# Patient Record
Sex: FEMALE | Race: WHITE | Employment: PART TIME | URBAN - METROPOLITAN AREA
[De-identification: names, ages, dates, MRNs, and addresses within clinical notes are randomized per-mention and may not be internally consistent; named-entity substitution may affect disease eponyms.]

---

## 2017-01-09 ENCOUNTER — HOSPITAL ENCOUNTER (OUTPATIENT)
Dept: OTHER | Age: 32
Discharge: HOME | End: 2017-01-09
Attending: INTERNAL MEDICINE | Admitting: INTERNAL MEDICINE

## 2017-01-09 LAB — TSH REFLEX: 1.34 UIU/ML (ref 0.27–4.2)

## 2017-01-10 ENCOUNTER — OFFICE VISIT (OUTPATIENT)
Dept: DERMATOLOGY | Age: 32
End: 2017-01-10

## 2017-01-10 DIAGNOSIS — L57.0 ACTINIC KERATOSES: ICD-10-CM

## 2017-01-10 DIAGNOSIS — D48.5 NEOPLASM OF UNCERTAIN BEHAVIOR OF SKIN: ICD-10-CM

## 2017-01-10 DIAGNOSIS — Z12.83 SCREENING EXAM FOR SKIN CANCER: ICD-10-CM

## 2017-01-10 DIAGNOSIS — D22.9 MULTIPLE BENIGN NEVI: Primary | ICD-10-CM

## 2017-01-10 PROCEDURE — 17000 DESTRUCT PREMALG LESION: CPT | Performed by: DERMATOLOGY

## 2017-01-10 PROCEDURE — 17003 DESTRUCT PREMALG LES 2-14: CPT | Performed by: DERMATOLOGY

## 2017-01-10 PROCEDURE — 11100 PR BIOPSY OF SKIN LESION: CPT | Performed by: DERMATOLOGY

## 2017-01-10 PROCEDURE — 99203 OFFICE O/P NEW LOW 30 MIN: CPT | Performed by: DERMATOLOGY

## 2017-01-11 ENCOUNTER — OFFICE VISIT (OUTPATIENT)
Dept: ENDOCRINOLOGY | Age: 32
End: 2017-01-11

## 2017-01-11 VITALS
DIASTOLIC BLOOD PRESSURE: 78 MMHG | BODY MASS INDEX: 31.92 KG/M2 | HEIGHT: 67 IN | OXYGEN SATURATION: 100 % | WEIGHT: 203.4 LBS | RESPIRATION RATE: 16 BRPM | SYSTOLIC BLOOD PRESSURE: 118 MMHG | HEART RATE: 76 BPM

## 2017-01-11 DIAGNOSIS — E04.1 THYROID NODULE: ICD-10-CM

## 2017-01-11 DIAGNOSIS — E03.9 ACQUIRED HYPOTHYROIDISM: Primary | ICD-10-CM

## 2017-01-11 DIAGNOSIS — E06.3 HASHIMOTO'S DISEASE: ICD-10-CM

## 2017-01-11 PROCEDURE — 99213 OFFICE O/P EST LOW 20 MIN: CPT | Performed by: INTERNAL MEDICINE

## 2017-01-12 ENCOUNTER — TELEPHONE (OUTPATIENT)
Dept: DERMATOLOGY | Age: 32
End: 2017-01-12

## 2017-01-17 RX ORDER — LEVOTHYROXINE SODIUM 150 MCG
TABLET ORAL
Qty: 90 TABLET | Refills: 1 | Status: SHIPPED | OUTPATIENT
Start: 2017-01-17 | End: 2017-08-04 | Stop reason: SDUPTHER

## 2017-01-25 LAB
HPV COMMENT: NORMAL
HPV TYPE 16: NOT DETECTED
HPV TYPE 18: NOT DETECTED
HPVOH (OTHER TYPES): NOT DETECTED

## 2017-02-23 ENCOUNTER — TELEPHONE (OUTPATIENT)
Dept: DERMATOLOGY | Age: 32
End: 2017-02-23

## 2017-02-28 ENCOUNTER — OFFICE VISIT (OUTPATIENT)
Dept: DERMATOLOGY | Age: 32
End: 2017-02-28

## 2017-02-28 DIAGNOSIS — L71.9 ROSACEA: Primary | ICD-10-CM

## 2017-02-28 PROCEDURE — 99213 OFFICE O/P EST LOW 20 MIN: CPT | Performed by: DERMATOLOGY

## 2017-03-22 ENCOUNTER — TELEPHONE (OUTPATIENT)
Dept: ENDOCRINOLOGY | Age: 32
End: 2017-03-22

## 2017-04-07 ENCOUNTER — HOSPITAL ENCOUNTER (OUTPATIENT)
Dept: OTHER | Age: 32
Discharge: HOME | End: 2017-04-07
Attending: INTERNAL MEDICINE | Admitting: INTERNAL MEDICINE

## 2017-04-07 ENCOUNTER — HOSPITAL ENCOUNTER (OUTPATIENT)
Dept: OTHER | Age: 32
Discharge: HOME | End: 2017-04-07
Attending: OBSTETRICS & GYNECOLOGY | Admitting: OBSTETRICS & GYNECOLOGY

## 2017-04-07 LAB
ABO/RH: NORMAL
ANTIBODY SCREEN: NORMAL
HEPATITIS C ANTIBODY INTERPRETATION: NORMAL
TSH REFLEX: 1.72 UIU/ML (ref 0.27–4.2)

## 2017-04-08 LAB
BASOPHILS ABSOLUTE: 0.1 K/UL (ref 0–0.2)
BASOPHILS RELATIVE PERCENT: 0.7 %
EOSINOPHILS ABSOLUTE: 0.1 K/UL (ref 0–0.6)
EOSINOPHILS RELATIVE PERCENT: 1.3 %
HCT VFR BLD CALC: 41.6 % (ref 36–48)
HEMOGLOBIN: 14 G/DL (ref 12–16)
HEPATITIS B SURFACE ANTIGEN INTERPRETATION: ABNORMAL
LYMPHOCYTES ABSOLUTE: 2.2 K/UL (ref 1–5.1)
LYMPHOCYTES RELATIVE PERCENT: 27.5 %
MCH RBC QN AUTO: 29.1 PG (ref 26–34)
MCHC RBC AUTO-ENTMCNC: 33.7 G/DL (ref 31–36)
MCV RBC AUTO: 86.5 FL (ref 80–100)
MONOCYTES ABSOLUTE: 0.6 K/UL (ref 0–1.3)
MONOCYTES RELATIVE PERCENT: 7.8 %
NEUTROPHILS ABSOLUTE: 5 K/UL (ref 1.7–7.7)
NEUTROPHILS RELATIVE PERCENT: 62.7 %
PDW BLD-RTO: 13.2 % (ref 12.4–15.4)
PLATELET # BLD: 125 K/UL (ref 135–450)
PMV BLD AUTO: 11.1 FL (ref 5–10.5)
RBC # BLD: 4.81 M/UL (ref 4–5.2)
RPR: ABNORMAL
RUBELLA ANTIBODY IGG: >500 IU/ML
WBC # BLD: 7.9 K/UL (ref 4–11)

## 2017-04-10 LAB — HIV-1 AND HIV-2 ANTIBODIES: NORMAL

## 2017-04-18 ENCOUNTER — OFFICE VISIT (OUTPATIENT)
Dept: ENDOCRINOLOGY | Age: 32
End: 2017-04-18

## 2017-04-18 VITALS
BODY MASS INDEX: 32.24 KG/M2 | SYSTOLIC BLOOD PRESSURE: 126 MMHG | WEIGHT: 205.4 LBS | RESPIRATION RATE: 16 BRPM | OXYGEN SATURATION: 99 % | HEIGHT: 67 IN | HEART RATE: 78 BPM | DIASTOLIC BLOOD PRESSURE: 83 MMHG

## 2017-04-18 DIAGNOSIS — E04.1 THYROID NODULE: ICD-10-CM

## 2017-04-18 DIAGNOSIS — E06.3 HASHIMOTO'S DISEASE: ICD-10-CM

## 2017-04-18 DIAGNOSIS — E03.9 ACQUIRED HYPOTHYROIDISM: Primary | ICD-10-CM

## 2017-04-18 PROCEDURE — 99213 OFFICE O/P EST LOW 20 MIN: CPT | Performed by: INTERNAL MEDICINE

## 2017-05-24 ENCOUNTER — OFFICE VISIT (OUTPATIENT)
Dept: INTERNAL MEDICINE CLINIC | Age: 32
End: 2017-05-24

## 2017-05-24 VITALS
SYSTOLIC BLOOD PRESSURE: 114 MMHG | RESPIRATION RATE: 16 BRPM | HEIGHT: 67 IN | DIASTOLIC BLOOD PRESSURE: 82 MMHG | OXYGEN SATURATION: 98 % | WEIGHT: 209 LBS | HEART RATE: 69 BPM | BODY MASS INDEX: 32.8 KG/M2

## 2017-05-24 DIAGNOSIS — Z00.00 WELL ADULT EXAM: Primary | ICD-10-CM

## 2017-05-24 PROCEDURE — 99395 PREV VISIT EST AGE 18-39: CPT | Performed by: INTERNAL MEDICINE

## 2017-05-24 RX ORDER — ACETAMINOPHEN 500 MG
500 TABLET ORAL PRN
Status: ON HOLD | COMMUNITY
End: 2017-11-21 | Stop reason: HOSPADM

## 2017-05-31 ENCOUNTER — TELEPHONE (OUTPATIENT)
Dept: INTERNAL MEDICINE CLINIC | Age: 32
End: 2017-05-31

## 2017-06-01 ENCOUNTER — HOSPITAL ENCOUNTER (OUTPATIENT)
Dept: OTHER | Age: 32
Discharge: HOME | End: 2017-06-01
Attending: INTERNAL MEDICINE | Admitting: INTERNAL MEDICINE

## 2017-06-01 DIAGNOSIS — Z00.00 WELL ADULT EXAM: ICD-10-CM

## 2017-06-01 LAB
CHOLESTEROL, TOTAL: 171 MG/DL (ref 0–199)
GLUCOSE BLD-MCNC: 84 MG/DL (ref 70–99)
HDLC SERPL-MCNC: 57 MG/DL (ref 40–60)
LDL CHOLESTEROL CALCULATED: 102 MG/DL
TRIGL SERPL-MCNC: 58 MG/DL (ref 0–150)
VLDLC SERPL CALC-MCNC: 12 MG/DL

## 2017-06-28 ENCOUNTER — OFFICE VISIT (OUTPATIENT)
Dept: ENDOCRINOLOGY | Age: 32
End: 2017-06-28

## 2017-06-28 VITALS
WEIGHT: 212 LBS | SYSTOLIC BLOOD PRESSURE: 116 MMHG | HEIGHT: 67 IN | HEART RATE: 96 BPM | DIASTOLIC BLOOD PRESSURE: 76 MMHG | OXYGEN SATURATION: 99 % | BODY MASS INDEX: 33.27 KG/M2

## 2017-06-28 DIAGNOSIS — E03.9 HYPOTHYROIDISM COMPLICATING PREGNANCY, SECOND TRIMESTER: ICD-10-CM

## 2017-06-28 DIAGNOSIS — E03.8 HYPOTHYROIDISM DUE TO HASHIMOTO'S THYROIDITIS: ICD-10-CM

## 2017-06-28 DIAGNOSIS — E06.3 HYPOTHYROIDISM DUE TO HASHIMOTO'S THYROIDITIS: ICD-10-CM

## 2017-06-28 DIAGNOSIS — E04.1 THYROID NODULE: ICD-10-CM

## 2017-06-28 DIAGNOSIS — E03.9 ACQUIRED HYPOTHYROIDISM: ICD-10-CM

## 2017-06-28 DIAGNOSIS — O99.282 HYPOTHYROIDISM COMPLICATING PREGNANCY, SECOND TRIMESTER: ICD-10-CM

## 2017-06-28 DIAGNOSIS — E55.9 VITAMIN D DEFICIENCY: ICD-10-CM

## 2017-06-28 DIAGNOSIS — E06.3 HASHIMOTO'S DISEASE: ICD-10-CM

## 2017-06-28 DIAGNOSIS — E06.3 HYPOTHYROIDISM DUE TO HASHIMOTO'S THYROIDITIS: Primary | ICD-10-CM

## 2017-06-28 DIAGNOSIS — E04.9 NONTOXIC NODULAR GOITER: ICD-10-CM

## 2017-06-28 DIAGNOSIS — E03.8 HYPOTHYROIDISM DUE TO HASHIMOTO'S THYROIDITIS: Primary | ICD-10-CM

## 2017-06-28 LAB
T3 FREE: 2.4 PG/ML (ref 2.3–4.2)
T3 TOTAL: 1.3 NG/ML (ref 0.8–2)
T4 FREE: 1.3 NG/DL (ref 0.9–1.8)
T4 TOTAL: 12.5 UG/DL (ref 4.5–10.9)
TSH SERPL DL<=0.05 MIU/L-ACNC: 1.55 UIU/ML (ref 0.27–4.2)
VITAMIN D 25-HYDROXY: 37.2 NG/ML

## 2017-06-28 PROCEDURE — 99214 OFFICE O/P EST MOD 30 MIN: CPT | Performed by: NURSE PRACTITIONER

## 2017-06-28 ASSESSMENT — PATIENT HEALTH QUESTIONNAIRE - PHQ9
2. FEELING DOWN, DEPRESSED OR HOPELESS: 0
SUM OF ALL RESPONSES TO PHQ QUESTIONS 1-9: 0
SUM OF ALL RESPONSES TO PHQ9 QUESTIONS 1 & 2: 0
1. LITTLE INTEREST OR PLEASURE IN DOING THINGS: 0

## 2017-07-01 LAB — T3 REVERSE: 32.5 NG/DL (ref 9–27)

## 2017-07-07 ENCOUNTER — HOSPITAL ENCOUNTER (OUTPATIENT)
Dept: OTHER | Age: 32
Discharge: HOME | End: 2017-07-07
Attending: OBSTETRICS & GYNECOLOGY | Admitting: OBSTETRICS & GYNECOLOGY

## 2017-07-07 LAB
HCT VFR BLD CALC: 39.2 % (ref 36–48)
HEMOGLOBIN: 13.5 G/DL (ref 12–16)
MCH RBC QN AUTO: 30 PG (ref 26–34)
MCHC RBC AUTO-ENTMCNC: 34.3 G/DL (ref 31–36)
MCV RBC AUTO: 87.5 FL (ref 80–100)
PDW BLD-RTO: 13.3 % (ref 12.4–15.4)
PLATELET # BLD: 129 K/UL (ref 135–450)
PMV BLD AUTO: 11.4 FL (ref 5–10.5)
RBC # BLD: 4.48 M/UL (ref 4–5.2)
TSH SERPL DL<=0.05 MIU/L-ACNC: 1.76 UIU/ML (ref 0.27–4.2)
WBC # BLD: 11.9 K/UL (ref 4–11)

## 2017-08-05 RX ORDER — LEVOTHYROXINE SODIUM 150 MCG
TABLET ORAL
Qty: 30 TABLET | Refills: 0 | Status: SHIPPED | OUTPATIENT
Start: 2017-08-05 | End: 2018-09-06 | Stop reason: SDUPTHER

## 2017-08-07 ENCOUNTER — HOSPITAL ENCOUNTER (OUTPATIENT)
Dept: OTHER | Age: 32
Discharge: HOME | End: 2017-08-07
Attending: NURSE PRACTITIONER | Admitting: NURSE PRACTITIONER

## 2017-08-07 ENCOUNTER — HOSPITAL ENCOUNTER (OUTPATIENT)
Dept: OTHER | Age: 32
Discharge: HOME | End: 2017-08-07
Attending: OBSTETRICS & GYNECOLOGY | Admitting: OBSTETRICS & GYNECOLOGY

## 2017-08-07 LAB
GLUCOSE CHALLENGE: 93 MG/DL
HCT VFR BLD CALC: 37.1 % (ref 36–48)
HEMOGLOBIN: 12.7 G/DL (ref 12–16)
MCH RBC QN AUTO: 30.2 PG (ref 26–34)
MCHC RBC AUTO-ENTMCNC: 34.1 G/DL (ref 31–36)
MCV RBC AUTO: 88.4 FL (ref 80–100)
PDW BLD-RTO: 13.7 % (ref 12.4–15.4)
PLATELET # BLD: 115 K/UL (ref 135–450)
PMV BLD AUTO: 12 FL (ref 5–10.5)
RBC # BLD: 4.19 M/UL (ref 4–5.2)
T3 FREE: 2.6 PG/ML (ref 2.3–4.2)
T3 TOTAL: 1.68 NG/ML (ref 0.8–2)
T4 FREE: 1.2 NG/DL (ref 0.9–1.8)
T4 TOTAL: 12.2 UG/DL (ref 4.5–10.9)
TSH SERPL DL<=0.05 MIU/L-ACNC: 1.45 UIU/ML (ref 0.27–4.2)
WBC # BLD: 11.1 K/UL (ref 4–11)

## 2017-08-08 ENCOUNTER — HOSPITAL ENCOUNTER (OUTPATIENT)
Dept: ULTRASOUND IMAGING | Age: 32
Discharge: HOME | End: 2017-08-08
Attending: NURSE PRACTITIONER | Admitting: NURSE PRACTITIONER

## 2017-08-08 DIAGNOSIS — E04.9 NONTOXIC NODULAR GOITER: ICD-10-CM

## 2017-08-08 DIAGNOSIS — E04.9 NONTOXIC GOITER: ICD-10-CM

## 2017-08-10 LAB — T3 REVERSE: 23.7 NG/DL (ref 9–27)

## 2017-10-13 ENCOUNTER — HOSPITAL ENCOUNTER (OUTPATIENT)
Dept: OTHER | Age: 32
Discharge: HOME | End: 2017-10-13
Attending: OBSTETRICS & GYNECOLOGY | Admitting: OBSTETRICS & GYNECOLOGY

## 2017-10-13 LAB
HCT VFR BLD CALC: 34.8 % (ref 36–48)
HEMOGLOBIN: 11.6 G/DL (ref 12–16)
MCH RBC QN AUTO: 28.4 PG (ref 26–34)
MCHC RBC AUTO-ENTMCNC: 33.3 G/DL (ref 31–36)
MCV RBC AUTO: 85.3 FL (ref 80–100)
PDW BLD-RTO: 13.4 % (ref 12.4–15.4)
PLATELET # BLD: 103 K/UL (ref 135–450)
PMV BLD AUTO: 10.7 FL (ref 5–10.5)
RBC # BLD: 4.08 M/UL (ref 4–5.2)
WBC # BLD: 12.1 K/UL (ref 4–11)

## 2017-11-06 LAB
HCT VFR BLD CALC: 35.7 % (ref 36–48)
HEMOGLOBIN: 12.1 G/DL (ref 12–16)
MCH RBC QN AUTO: 28 PG (ref 26–34)
MCHC RBC AUTO-ENTMCNC: 33.8 G/DL (ref 31–36)
MCV RBC AUTO: 82.9 FL (ref 80–100)
PDW BLD-RTO: 14.6 % (ref 12.4–15.4)
PLATELET # BLD: 101 K/UL (ref 135–450)
PMV BLD AUTO: 10.4 FL (ref 5–10.5)
RBC # BLD: 4.3 M/UL (ref 4–5.2)
TSH SERPL DL<=0.05 MIU/L-ACNC: 1.15 UIU/ML (ref 0.27–4.2)
WBC # BLD: 9.8 K/UL (ref 4–11)

## 2017-11-21 PROBLEM — O99.119 THROMBOCYTOPENIA AFFECTING PREGNANCY (HCC): Status: ACTIVE | Noted: 2017-11-21

## 2017-11-21 PROBLEM — D69.6 BENIGN GESTATIONAL THROMBOCYTOPENIA IN THIRD TRIMESTER (HCC): Status: ACTIVE | Noted: 2017-11-21

## 2017-11-21 PROBLEM — D69.6 THROMBOCYTOPENIA AFFECTING PREGNANCY (HCC): Status: ACTIVE | Noted: 2017-11-21

## 2017-11-21 PROBLEM — O99.113 BENIGN GESTATIONAL THROMBOCYTOPENIA IN THIRD TRIMESTER (HCC): Status: ACTIVE | Noted: 2017-11-21

## 2018-01-11 ENCOUNTER — OFFICE VISIT (OUTPATIENT)
Dept: FAMILY MEDICINE CLINIC | Age: 33
End: 2018-01-11

## 2018-01-11 VITALS
WEIGHT: 231 LBS | HEIGHT: 67 IN | SYSTOLIC BLOOD PRESSURE: 124 MMHG | DIASTOLIC BLOOD PRESSURE: 84 MMHG | HEART RATE: 94 BPM | OXYGEN SATURATION: 96 % | BODY MASS INDEX: 36.26 KG/M2

## 2018-01-11 DIAGNOSIS — B34.9 VIRAL INFECTION: Primary | ICD-10-CM

## 2018-01-11 LAB
INFLUENZA A ANTIBODY: NORMAL
INFLUENZA B ANTIBODY: NORMAL

## 2018-01-11 PROCEDURE — 87804 INFLUENZA ASSAY W/OPTIC: CPT | Performed by: INTERNAL MEDICINE

## 2018-01-11 PROCEDURE — 99213 OFFICE O/P EST LOW 20 MIN: CPT | Performed by: INTERNAL MEDICINE

## 2018-01-11 NOTE — PROGRESS NOTES
Subjective:      Patient ID: Param Sheriff is a 33 y.o. female.     HPI    Review of Systems    Objective:   Physical Exam    Assessment:      ***      Plan:      ***
infection    - POCT Influenza A/B  -tylenol , advil  Prn  -lots of fluid  -rest        Shaquille Dunn MD

## 2018-01-13 ASSESSMENT — ENCOUNTER SYMPTOMS
RHINORRHEA: 1
COUGH: 1
SORE THROAT: 1

## 2018-02-16 ENCOUNTER — NURSE TRIAGE (OUTPATIENT)
Dept: OTHER | Facility: CLINIC | Age: 33
End: 2018-02-16

## 2018-02-17 LAB
CHOLESTEROL, TOTAL: 137 MG/DL
CHOLESTEROL/HDL RATIO: NORMAL
HDLC SERPL-MCNC: 49 MG/DL (ref 35–70)
LDL CHOLESTEROL CALCULATED: 80 MG/DL (ref 0–160)
TRIGL SERPL-MCNC: 40 MG/DL
VLDLC SERPL CALC-MCNC: NORMAL MG/DL

## 2018-02-19 ENCOUNTER — TELEPHONE (OUTPATIENT)
Dept: FAMILY MEDICINE CLINIC | Age: 33
End: 2018-02-19

## 2018-02-19 DIAGNOSIS — G45.9 TRANSIENT CEREBRAL ISCHEMIA, UNSPECIFIED TYPE: Primary | ICD-10-CM

## 2018-02-22 ENCOUNTER — OFFICE VISIT (OUTPATIENT)
Dept: FAMILY MEDICINE CLINIC | Age: 33
End: 2018-02-22

## 2018-02-22 VITALS
SYSTOLIC BLOOD PRESSURE: 108 MMHG | HEIGHT: 67 IN | HEART RATE: 62 BPM | RESPIRATION RATE: 16 BRPM | DIASTOLIC BLOOD PRESSURE: 76 MMHG | OXYGEN SATURATION: 99 % | BODY MASS INDEX: 34.84 KG/M2 | WEIGHT: 222 LBS

## 2018-02-22 DIAGNOSIS — G45.3 AMAUROSIS FUGAX: Primary | ICD-10-CM

## 2018-02-22 DIAGNOSIS — E03.8 HYPOTHYROIDISM DUE TO HASHIMOTO'S THYROIDITIS: ICD-10-CM

## 2018-02-22 DIAGNOSIS — D69.6 BENIGN GESTATIONAL THROMBOCYTOPENIA IN THIRD TRIMESTER (HCC): ICD-10-CM

## 2018-02-22 DIAGNOSIS — O99.113 BENIGN GESTATIONAL THROMBOCYTOPENIA IN THIRD TRIMESTER (HCC): ICD-10-CM

## 2018-02-22 DIAGNOSIS — E06.3 HYPOTHYROIDISM DUE TO HASHIMOTO'S THYROIDITIS: ICD-10-CM

## 2018-02-22 PROCEDURE — 99215 OFFICE O/P EST HI 40 MIN: CPT | Performed by: INTERNAL MEDICINE

## 2018-02-22 PROCEDURE — 1111F DSCHRG MED/CURRENT MED MERGE: CPT | Performed by: INTERNAL MEDICINE

## 2018-02-22 RX ORDER — ASPIRIN 81 MG/1
81 TABLET, CHEWABLE ORAL DAILY
COMMUNITY
End: 2018-06-26

## 2018-02-26 ENCOUNTER — HOSPITAL ENCOUNTER (OUTPATIENT)
Dept: OTHER | Age: 33
Discharge: HOME | End: 2018-02-26
Attending: INTERNAL MEDICINE | Admitting: INTERNAL MEDICINE

## 2018-02-26 LAB
BASOPHILS ABSOLUTE: 0.1 K/UL (ref 0–0.2)
BASOPHILS RELATIVE PERCENT: 0.7 %
EOSINOPHILS ABSOLUTE: 0.1 K/UL (ref 0–0.6)
EOSINOPHILS RELATIVE PERCENT: 1.1 %
HCT VFR BLD CALC: 41 % (ref 36–48)
HEMOGLOBIN: 14.4 G/DL (ref 12–16)
HOMOCYSTEINE: 11 UMOL/L (ref 0–10)
LYMPHOCYTES ABSOLUTE: 2 K/UL (ref 1–5.1)
LYMPHOCYTES RELATIVE PERCENT: 25.9 %
MCH RBC QN AUTO: 29 PG (ref 26–34)
MCHC RBC AUTO-ENTMCNC: 35.1 G/DL (ref 31–36)
MCV RBC AUTO: 82.7 FL (ref 80–100)
MONOCYTES ABSOLUTE: 0.6 K/UL (ref 0–1.3)
MONOCYTES RELATIVE PERCENT: 7.8 %
NEUTROPHILS ABSOLUTE: 5.1 K/UL (ref 1.7–7.7)
NEUTROPHILS RELATIVE PERCENT: 64.5 %
PDW BLD-RTO: 14.7 % (ref 12.4–15.4)
PLATELET # BLD: 162 K/UL (ref 135–450)
PMV BLD AUTO: 10.5 FL (ref 5–10.5)
RBC # BLD: 4.95 M/UL (ref 4–5.2)
WBC # BLD: 7.9 K/UL (ref 4–11)

## 2018-02-28 LAB
ANTICARDIOLIPIN IGG ANTIBODY: 8 GPL (ref 0–14)
BETA-2 GLYCOPROTEIN 1 IGA ANTIBODY: 1 SAU (ref 0–20)
CARDIOLIPIN AB IGM: 23 MPL (ref 0–12)
DRVVT CONFIRMATION TEST: ABNORMAL RATIO
DRVVT SCREEN: 29 SEC (ref 33–44)
DRVVT,DIL: ABNORMAL SEC (ref 33–44)
HEXAGONAL PHOSPHOLIPID NEUTRALIZAT TEST: ABNORMAL
LUPUS ANTICOAG INTERP: ABNORMAL
PLT NEUTA: ABNORMAL
PT D: 13.6 SEC (ref 12–15.5)
PTT D: 51 SEC (ref 32–48)
PTT-D CORR REFLEX: 45 SEC (ref 32–48)
PTT-HEPARIN NEUTRALIZED: ABNORMAL SEC (ref 32–48)
REPTILASE TIME: ABNORMAL SEC
THROMBIN TIME: 16.7 SEC (ref 14.7–19.5)

## 2018-03-08 ENCOUNTER — INITIAL CONSULT (OUTPATIENT)
Dept: NEUROLOGY | Age: 33
End: 2018-03-08

## 2018-03-08 VITALS
HEIGHT: 67 IN | DIASTOLIC BLOOD PRESSURE: 76 MMHG | BODY MASS INDEX: 33.9 KG/M2 | OXYGEN SATURATION: 98 % | SYSTOLIC BLOOD PRESSURE: 115 MMHG | HEART RATE: 70 BPM | WEIGHT: 216 LBS

## 2018-03-08 DIAGNOSIS — D68.59 HYPERCOAGULABLE STATE (HCC): ICD-10-CM

## 2018-03-08 DIAGNOSIS — G45.3 AMAUROSIS FUGAX: Primary | ICD-10-CM

## 2018-03-08 DIAGNOSIS — G43.109 MIGRAINE WITH AURA AND WITHOUT STATUS MIGRAINOSUS, NOT INTRACTABLE: ICD-10-CM

## 2018-03-08 PROCEDURE — 99244 OFF/OP CNSLTJ NEW/EST MOD 40: CPT | Performed by: PSYCHIATRY & NEUROLOGY

## 2018-03-08 RX ORDER — LANOLIN ALCOHOL/MO/W.PET/CERES
400 CREAM (GRAM) TOPICAL DAILY
COMMUNITY
End: 2019-03-20 | Stop reason: ALTCHOICE

## 2018-03-08 ASSESSMENT — ENCOUNTER SYMPTOMS
RESPIRATORY NEGATIVE: 1
EYES NEGATIVE: 1
GASTROINTESTINAL NEGATIVE: 1

## 2018-03-08 NOTE — LETTER
Wicho Abreu MD    [facility] Neurology  [address] [facility], [address] [place] [address]. [address] [place] [address]    [phone] (Phone)  [phone] (Fax)    Dear Dr Erik Castro MD    I had the pleasure of seeing your patient Wicho Abreu  1985 today. I have attached a detailed summary below:    The patient is a 33y.o. years old female who  was referred by Erik Castro MD  for consultation regarding recent visual disturbance and possible TIA. HPI:  The patient was doing well until about three weeks ago when she had a sudden onset of left eye painless visual loss. She couldn't see from the left eye. Symptoms are sudden severe. No triggers or other associates symptoms. No headache, weakness or numbness or tingling, dysphagia or dysarthria. No chest pain. Patient was seen at [place] [facility] and then transferred to [facility] for possible retinal detachment. She was seen by ophthalmology and neurology. Symptoms improved gradually after several hours. She was discharged home with possible diagnosis of TIA. Patient had unremarkable MRI and a CTA of the head and neck. Since such incident, she denies any more episode of visual loss or weakness or numbness or tingling. She denies any prior history of strokes or TIA. No history of frequent migraine with aura. She hasn't had a recent JENNIFER or TTE. She is now on aspirin daily. The patient is seen by Dr. Emma Bradley for possible hypercoagulable state. She had positive anti-cardiolipin IgM and equivocal APL ab. Dr. Emma Bradley recommended [facility] however the patient is somewhat resisting starting anticoagulation. She is now on aspirin daily. She had one miscarriage in the past due to placenta previa. She denies a family history of recurrent DVT or PE. She does not smoke or use drugs. Denies any sleep disturbance or symptoms sleep apnea.   Recent blood test showed unremarkable TSH and A1c.  No history of hypertension. History of cardiac arrhythmia and had unremarkable event monitor for two weeks in the past. No history of PAF. Patient denies any other new symptoms today. Other review of system was unremarkable. Past Medical History:   Diagnosis Date    Abnormal Pap smear of cervix     Colposcopy normal; pt states believes was med error-wrong patient    Anemia     during childhood    Hypertension     PIH with 1st pregnancy; no pre-eclampsia-labs all WNL    Hypothyroidism     Polycystic disease, ovaries      Prior to Visit Medications    Medication Sig Taking? Authorizing Provider   folic acid (FOLVITE) 970 MCG tablet Take 400 mcg by mouth daily Yes Historical Provider, MD   aspirin 81 MG chewable tablet Take 81 mg by mouth daily Yes Historical Provider, MD   SYNTHROID 150 MCG tablet TAKE 1 Carissa Coulterels Yes Vielka Key MD   Prenatal Multivit-Min-Fe-FA (PRENATAL #2 PO) Take  by mouth.  Yes Historical Provider, MD     Allergies   Allergen Reactions    Sulfa Antibiotics      Passed out     Social History   Substance Use Topics    Smoking status: Never Smoker    Smokeless tobacco: Never Used    Alcohol use No     Family History   Problem Relation Age of Onset    Thyroid Cancer Paternal Grandfather      Metastatic    Thyroid Cancer Maternal Grandfather     Cancer Maternal Grandfather      skin,NMSC    Heart Attack Maternal Grandfather      smoker    High Blood Pressure Maternal Grandfather     High Blood Pressure Maternal Aunt     Miscarriages / Stillbirths Maternal Aunt     Miscarriages / Stillbirths Paternal Aunt     Heart Attack Maternal Grandmother      smoker    High Blood Pressure Maternal Grandmother     Stroke Maternal Grandmother     Asthma Paternal Grandmother      smoker    Osteoporosis Paternal Grandmother     Hearing Loss Paternal Uncle      Past Surgical History:   Procedure Laterality Date    SHOULDER SURGERY      left shoulder test results reviewed and discussed with the patient. Reviewed notes from other physicians and outside records. Provided patient education regarding risk, benefits and treatment options as well as adherence to medication regimen and side effect from these medications . Benefit and risk of aspirin versus Coumadin. We discussed secondary stroke prevention and risk of bleeding with Coumadin. .       Assessment:  Recent monocular painless visual loss. Likely TIA. So far cryptogenic or could be related to underlying hypercoagulable state. History of episodic migraine with aura        Plan:  Long discussion with the patient regarding her test results and secondary stroke prevention. I reviewed her outside records including recent UC visit and recent CTA, blood testing and MRI results. Reviewed notes from physicians including Dr. Judd Bartholomew her hematologist for possible hypercoagulable state. The patient is not considering Coumadin at this time. She is scheduled for another hypercoagulable profile in the next 10-12 weeks. She doesn't have plans for future pregnancies. I would recommend getting a JENNIFER. I did advise the patient to consider Coumadin giving new diagnosis of TIA and possible hypercoagulable state. I feel the benefits outweigh the risks. The patient however would like to repeat such testing in 2-3 months and she will consider Coumadin if her lupus AC came back positive. She'll stay with aspirin in the mean time. I will call the patient back for her JENNIFER result,  in the meantime she'll give me a call in two month to update me on the results of her blood testing. Discussed option of event monitor although she had two weeks event monitor in the past that showed no evidence of paroxysmal A. fib. If she decided to be on Coumadin, no need for event monitor.               Please do not hesitate to contact me, should you have any questions or concerns regarding the care of [facility], located within [facility]     Sincerely,

## 2018-03-09 ENCOUNTER — TELEPHONE (OUTPATIENT)
Dept: NEUROLOGY | Age: 33
End: 2018-03-09

## 2018-03-09 DIAGNOSIS — G45.3 AMAUROSIS FUGAX: ICD-10-CM

## 2018-03-09 DIAGNOSIS — D68.59 HYPERCOAGULABLE STATE (HCC): Primary | ICD-10-CM

## 2018-04-02 ENCOUNTER — OFFICE VISIT (OUTPATIENT)
Dept: CARDIOLOGY CLINIC | Age: 33
End: 2018-04-02

## 2018-04-02 VITALS
BODY MASS INDEX: 32.57 KG/M2 | HEIGHT: 67 IN | HEART RATE: 60 BPM | DIASTOLIC BLOOD PRESSURE: 80 MMHG | WEIGHT: 207.5 LBS | SYSTOLIC BLOOD PRESSURE: 116 MMHG | OXYGEN SATURATION: 97 %

## 2018-04-02 DIAGNOSIS — D68.59 HYPERCOAGULABLE STATE (HCC): ICD-10-CM

## 2018-04-02 DIAGNOSIS — E06.3 HYPOTHYROIDISM DUE TO HASHIMOTO'S THYROIDITIS: ICD-10-CM

## 2018-04-02 DIAGNOSIS — G45.3 AMAUROSIS FUGAX: Primary | ICD-10-CM

## 2018-04-02 DIAGNOSIS — E03.8 HYPOTHYROIDISM DUE TO HASHIMOTO'S THYROIDITIS: ICD-10-CM

## 2018-04-02 PROCEDURE — 99204 OFFICE O/P NEW MOD 45 MIN: CPT | Performed by: INTERNAL MEDICINE

## 2018-04-02 PROCEDURE — 93000 ELECTROCARDIOGRAM COMPLETE: CPT | Performed by: INTERNAL MEDICINE

## 2018-04-03 ENCOUNTER — TELEPHONE (OUTPATIENT)
Dept: CARDIOLOGY CLINIC | Age: 33
End: 2018-04-03

## 2018-05-14 ENCOUNTER — NURSE TRIAGE (OUTPATIENT)
Dept: OTHER | Facility: CLINIC | Age: 33
End: 2018-05-14

## 2018-05-14 ENCOUNTER — HOSPITAL ENCOUNTER (OUTPATIENT)
Dept: OTHER | Age: 33
Discharge: HOME | End: 2018-05-14
Attending: INTERNAL MEDICINE | Admitting: INTERNAL MEDICINE

## 2018-05-14 LAB — RHEUMATOID FACTOR: <10 IU/ML

## 2018-05-15 LAB
ANA INTERPRETATION: NORMAL
ANTI-NUCLEAR ANTIBODY (ANA): NEGATIVE

## 2018-05-17 LAB
ANTICARDIOLIPIN IGG ANTIBODY: 5 GPL (ref 0–14)
BETA-2 GLYCOPROTEIN 1 IGG ANTIBODY: 0 SGU (ref 0–20)
BETA-2 GLYCOPROTEIN 1 IGM ANTIBODY: 8 SMU (ref 0–20)
CARDIOLIPIN AB IGM: 16 MPL (ref 0–12)
DRVVT CONFIRMATION TEST: ABNORMAL RATIO
DRVVT SCREEN: 33 SEC (ref 33–44)
DRVVT,DIL: ABNORMAL SEC (ref 33–44)
HEXAGONAL PHOSPHOLIPID NEUTRALIZAT TEST: ABNORMAL
LUPUS ANTICOAG INTERP: ABNORMAL
PLT NEUTA: ABNORMAL
PT D: 14.3 SEC (ref 12–15.5)
PTT D: 52 SEC (ref 32–48)
PTT-D CORR REFLEX: 43 SEC (ref 32–48)
PTT-HEPARIN NEUTRALIZED: ABNORMAL SEC (ref 32–48)
REPTILASE TIME: ABNORMAL SEC
THROMBIN TIME: 15.9 SEC (ref 14.7–19.5)

## 2018-06-05 ENCOUNTER — TELEPHONE (OUTPATIENT)
Dept: FAMILY MEDICINE CLINIC | Age: 33
End: 2018-06-05

## 2018-06-05 DIAGNOSIS — Z00.00 WELL ADULT EXAM: Primary | ICD-10-CM

## 2018-06-26 ENCOUNTER — OFFICE VISIT (OUTPATIENT)
Dept: FAMILY MEDICINE CLINIC | Age: 33
End: 2018-06-26

## 2018-06-26 VITALS
WEIGHT: 182 LBS | BODY MASS INDEX: 28.56 KG/M2 | HEART RATE: 86 BPM | OXYGEN SATURATION: 98 % | HEIGHT: 67 IN | DIASTOLIC BLOOD PRESSURE: 70 MMHG | SYSTOLIC BLOOD PRESSURE: 102 MMHG

## 2018-06-26 DIAGNOSIS — Z00.00 WELL ADULT EXAM: Primary | ICD-10-CM

## 2018-06-26 PROCEDURE — 99395 PREV VISIT EST AGE 18-39: CPT | Performed by: NURSE PRACTITIONER

## 2018-06-26 ASSESSMENT — ENCOUNTER SYMPTOMS
COUGH: 0
SHORTNESS OF BREATH: 0
VOMITING: 0
NAUSEA: 0
DIARRHEA: 0

## 2018-08-01 ENCOUNTER — HOSPITAL ENCOUNTER (OUTPATIENT)
Age: 33
Discharge: HOME | End: 2018-08-01
Payer: COMMERCIAL

## 2018-08-01 ENCOUNTER — OFFICE VISIT (OUTPATIENT)
Dept: ENDOCRINOLOGY | Age: 33
End: 2018-08-01

## 2018-08-01 VITALS
OXYGEN SATURATION: 98 % | WEIGHT: 177.2 LBS | SYSTOLIC BLOOD PRESSURE: 105 MMHG | DIASTOLIC BLOOD PRESSURE: 74 MMHG | HEART RATE: 68 BPM | BODY MASS INDEX: 27.81 KG/M2 | HEIGHT: 67 IN

## 2018-08-01 DIAGNOSIS — E06.3 HYPOTHYROIDISM DUE TO HASHIMOTO'S THYROIDITIS: ICD-10-CM

## 2018-08-01 DIAGNOSIS — E03.8 HYPOTHYROIDISM DUE TO HASHIMOTO'S THYROIDITIS: ICD-10-CM

## 2018-08-01 DIAGNOSIS — E06.3 HASHIMOTO'S DISEASE: ICD-10-CM

## 2018-08-01 DIAGNOSIS — E04.1 THYROID NODULE: Primary | ICD-10-CM

## 2018-08-01 LAB
T4 FREE: 1.6 NG/DL (ref 0.9–1.8)
TSH SERPL DL<=0.05 MIU/L-ACNC: 5.77 UIU/ML (ref 0.27–4.2)

## 2018-08-01 PROCEDURE — 36415 COLL VENOUS BLD VENIPUNCTURE: CPT

## 2018-08-01 PROCEDURE — 84439 ASSAY OF FREE THYROXINE: CPT

## 2018-08-01 PROCEDURE — 84443 ASSAY THYROID STIM HORMONE: CPT

## 2018-08-01 PROCEDURE — 99203 OFFICE O/P NEW LOW 30 MIN: CPT | Performed by: NURSE PRACTITIONER

## 2018-08-01 ASSESSMENT — PATIENT HEALTH QUESTIONNAIRE - PHQ9
SUM OF ALL RESPONSES TO PHQ QUESTIONS 1-9: 0
2. FEELING DOWN, DEPRESSED OR HOPELESS: 0
1. LITTLE INTEREST OR PLEASURE IN DOING THINGS: 0
SUM OF ALL RESPONSES TO PHQ9 QUESTIONS 1 & 2: 0

## 2018-08-01 ASSESSMENT — ENCOUNTER SYMPTOMS
BACK PAIN: 0
SHORTNESS OF BREATH: 0
EYE PAIN: 0
CONSTIPATION: 0
COLOR CHANGE: 0
ABDOMINAL PAIN: 0
DIARRHEA: 0

## 2018-08-01 NOTE — PROGRESS NOTES
Endocrinology  [place], [place]  Phone: [phone]   FAX: [phone]    Fely Lazar is a 29 y.o. female who presents for evaluation of  hypothyroidism    HPI     Last BP Readings:   BP Readings from Last 3 Encounters:   08/01/18 105/74   06/26/18 102/70   04/02/18 116/80     Last LDL:   Lab Results   Component Value Date    LDLCALC 80 02/17/2018     Aspirin Use: No    Tobacco History:      Smoking status: Never Smoker    Smokeless tobacco: Never Used    Alcohol use No    Drug use: No     Thyroid:   Trinity Fung has a h/o hypothyroidism for 4+ years. Has been on varying dosages of levothyroxine and is currently on synthroid 150 mcg. Confirms that it is taken in early am on an empty stomach. Clarified that nothing to eat for at least 30 minutes after and no iron or calcium for at.least 3-4 hours after.     Current symptoms include weight loss ( 47.5 lbs intentional), amenorrhea (currently lactating)   Patient denies denies fatigue, weight changes, heat/cold intolerance, bowel/skin changes or CVS symptoms.      Obstructive symptoms  - Change in voice: none  - Trouble swallowing: none    Predisposing factors for thyroid disease  Exposure to radiation (neck): none  Exposure to iodine: none  FH of thyroid disease: MGF (total thyroidectomy), MGM also total thyroidectomy,   FH of thyroid or other cancers: PGF had extensive metastases     Lab Results   Component Value Date    TSH 1.15 11/06/2017    TSH 1.45 08/07/2017    TSH 1.76 07/07/2017    FT3 2.6 08/07/2017    FT3 2.4 06/28/2017    T4FREE 1.2 08/07/2017    T4FREE 1.3 06/28/2017    T4FREE 1.4 09/30/2014   \  8/8/2017    EXAMINATION:THYROID ULTRASOUND    8/8/2017    COMPARISON:12/11/2015    HISTORY:ORDERING PHYSICIAN PROVIDED HISTORY: Nontoxic nodular goiter    FINDINGS:  Right thyroid lobe:  5.6 x 1.5 x 1.5 cm    Left thyroid lobe:  4.7 x 1.5 x 1.6 cm    Isthmus:  3 mm    Thyroid Gland:  Thyroid gland demonstrates heterogeneous echotexture and vascularity. Nodules: No change in the 5 mm calcification in the left inferior gland. Cervical lymphadenopathy: No abnormal lymph nodes in the imaged portions of the neck. Impression   1. Nonspecific heterogeneous thyroid echotexture     Past Medical History:   Diagnosis Date    Abnormal Pap smear of cervix     Colposcopy normal; pt states believes was med error-wrong patient    Anemia     during childhood    Hypertension     PIH with 1st pregnancy; no pre-eclampsia-labs all WNL    Hypothyroidism     Polycystic disease, ovaries     TIA (transient ischemic attack) 02/16/2018     Family History   Problem Relation Age of Onset    Thyroid Cancer Paternal Grandfather         Metastatic    Thyroid Cancer Maternal Grandfather     Cancer Maternal Grandfather         skin,NMSC    Heart Attack Maternal Grandfather         smoker    High Blood Pressure Maternal Grandfather     High Blood Pressure Maternal Aunt     Miscarriages / Stillbirths Maternal Aunt     Miscarriages / Stillbirths Paternal Aunt     Heart Attack Maternal Grandmother         smoker    High Blood Pressure Maternal Grandmother     Stroke Maternal Grandmother     Asthma Paternal Grandmother         smoker    Osteoporosis Paternal Grandmother     Hearing Loss Paternal Uncle      Current Outpatient Prescriptions   Medication Sig Dispense Refill    Probiotic Product (PROBIOTIC-10) CAPS Take by mouth daily      folic acid (FOLVITE) 972 MCG tablet Take 400 mcg by mouth daily      SYNTHROID 150 MCG tablet TAKE 1 TABLET BY MOUTH ONE TIME DAILY 30 tablet 0    Prenatal Multivit-Min-Fe-FA (PRENATAL #2 PO) Take  by mouth. No current facility-administered medications for this visit. Review of Systems   Constitutional: Negative for activity change, appetite change, diaphoresis, fatigue and unexpected weight change. Eyes: Negative for pain and visual disturbance. Respiratory: Negative for shortness of breath.     Cardiovascular:

## 2018-08-02 ENCOUNTER — TELEPHONE (OUTPATIENT)
Dept: ENDOCRINOLOGY | Age: 33
End: 2018-08-02

## 2018-09-07 RX ORDER — LEVOTHYROXINE SODIUM 150 MCG
TABLET ORAL
Qty: 90 TABLET | Refills: 1 | Status: SHIPPED | OUTPATIENT
Start: 2018-09-07 | End: 2019-03-20 | Stop reason: SDUPTHER

## 2018-09-10 ENCOUNTER — HOSPITAL ENCOUNTER (OUTPATIENT)
Dept: ULTRASOUND IMAGING | Age: 33
Discharge: HOME | End: 2018-09-10
Payer: COMMERCIAL

## 2018-09-10 DIAGNOSIS — E04.1 THYROID NODULE: ICD-10-CM

## 2018-09-10 PROCEDURE — 76536 US EXAM OF HEAD AND NECK: CPT

## 2018-12-06 ENCOUNTER — TELEPHONE (OUTPATIENT)
Dept: FAMILY MEDICINE CLINIC | Age: 33
End: 2018-12-06

## 2018-12-10 ENCOUNTER — OFFICE VISIT (OUTPATIENT)
Dept: FAMILY MEDICINE CLINIC | Age: 33
End: 2018-12-10
Payer: COMMERCIAL

## 2018-12-10 VITALS
DIASTOLIC BLOOD PRESSURE: 78 MMHG | HEIGHT: 67 IN | SYSTOLIC BLOOD PRESSURE: 118 MMHG | WEIGHT: 163 LBS | OXYGEN SATURATION: 98 % | BODY MASS INDEX: 25.58 KG/M2 | HEART RATE: 86 BPM

## 2018-12-10 DIAGNOSIS — J21.9 ACUTE BRONCHIOLITIS DUE TO UNSPECIFIED ORGANISM: Primary | ICD-10-CM

## 2018-12-10 PROCEDURE — 99213 OFFICE O/P EST LOW 20 MIN: CPT | Performed by: NURSE PRACTITIONER

## 2018-12-10 RX ORDER — METHYLPREDNISOLONE 4 MG/1
TABLET ORAL
Qty: 1 KIT | Refills: 0 | Status: SHIPPED | OUTPATIENT
Start: 2018-12-10 | End: 2018-12-16

## 2018-12-10 RX ORDER — AZITHROMYCIN 250 MG/1
250 TABLET, FILM COATED ORAL SEE ADMIN INSTRUCTIONS
Qty: 6 TABLET | Refills: 0 | Status: SHIPPED | OUTPATIENT
Start: 2018-12-10 | End: 2018-12-15

## 2018-12-10 ASSESSMENT — ENCOUNTER SYMPTOMS
NAUSEA: 0
FACIAL SWELLING: 0
BACK PAIN: 0
SINUS PRESSURE: 1
EYE PAIN: 0
CHOKING: 0
CHEST TIGHTNESS: 0
COUGH: 1
VOICE CHANGE: 0
COLOR CHANGE: 0
ABDOMINAL PAIN: 0
SORE THROAT: 0
EYE REDNESS: 0
SINUS PAIN: 1
ABDOMINAL DISTENTION: 0
TROUBLE SWALLOWING: 0
WHEEZING: 1

## 2018-12-10 NOTE — PROGRESS NOTES
pain and redness. Respiratory: Positive for cough and wheezing. Negative for choking and chest tightness. Expiratory wheezes. Cardiovascular: Negative for chest pain, palpitations and leg swelling. Gastrointestinal: Negative for abdominal distention, abdominal pain and nausea. Genitourinary: Negative for difficulty urinating and flank pain. Musculoskeletal: Negative for back pain, gait problem and joint swelling. Skin: Negative for color change, pallor and rash. Neurological: Positive for headaches. Negative for dizziness, syncope and facial asymmetry. Frontal sinus pressure          Physical Exam   Constitutional: She is oriented to person, place, and time. She appears well-developed and well-nourished. No distress. HENT:   Head: Normocephalic and atraumatic. Eyes: Pupils are equal, round, and reactive to light. Neck: No tracheal deviation present. No thyromegaly present. Cardiovascular: Normal rate, regular rhythm and normal heart sounds. Exam reveals no gallop and no friction rub. No murmur heard. Pulmonary/Chest: No stridor. She has wheezes. Abdominal: Soft. Bowel sounds are normal.   Musculoskeletal: Normal range of motion. She exhibits no edema. Neurological: She is alert and oriented to person, place, and time. Skin: Skin is warm and dry. Capillary refill takes 2 to 3 seconds. Psychiatric: She has a normal mood and affect. Her behavior is normal. Judgment and thought content normal.       Vitals:    12/10/18 1528   BP: 118/78   Site: Left Upper Arm   Position: Sitting   Cuff Size: Medium Adult   Pulse: 86   SpO2: 98%   Weight: 163 lb (73.9 kg)   Height: 5' 7\" (1.702 m)       Assessment/Plan:   1. Acute bronchiolitis due to unspecified organism    - methylPREDNISolone (MEDROL DOSEPACK) 4 MG tablet; Take by mouth. Dispense: 1 kit; Refill: 0  - azithromycin (ZITHROMAX) 250 MG tablet;  Take 1 tablet by mouth See Admin Instructions for 5 days 500mg on day 1 followed by 250mg on days 2 - 5  Dispense: 6 tablet; Refill: 0      Outpatient Encounter Prescriptions as of 12/10/2018   Medication Sig Dispense Refill    SYNTHROID 150 MCG tablet TAKE 1 TABLET BY MOUTH ONE TIME DAILY 90 tablet 1    Probiotic Product (PROBIOTIC-10) CAPS Take by mouth daily      folic acid (FOLVITE) 316 MCG tablet Take 400 mcg by mouth daily      Prenatal Multivit-Min-Fe-FA (PRENATAL #2 PO) Take  by mouth. No facility-administered encounter medications on file as of 12/10/2018.           Andreia Marlow, CNP

## 2019-03-20 ENCOUNTER — OFFICE VISIT (OUTPATIENT)
Dept: ENDOCRINOLOGY | Age: 34
End: 2019-03-20
Payer: COMMERCIAL

## 2019-03-20 ENCOUNTER — HOSPITAL ENCOUNTER (OUTPATIENT)
Age: 34
Discharge: HOME | End: 2019-03-20
Payer: COMMERCIAL

## 2019-03-20 VITALS
SYSTOLIC BLOOD PRESSURE: 104 MMHG | BODY MASS INDEX: 26.06 KG/M2 | OXYGEN SATURATION: 99 % | DIASTOLIC BLOOD PRESSURE: 70 MMHG | HEIGHT: 67 IN | HEART RATE: 70 BPM | WEIGHT: 166 LBS

## 2019-03-20 DIAGNOSIS — E06.3 HASHIMOTO'S DISEASE: ICD-10-CM

## 2019-03-20 DIAGNOSIS — E06.3 HASHIMOTO'S DISEASE: Primary | ICD-10-CM

## 2019-03-20 LAB
T4 FREE: 1.7 NG/DL (ref 0.9–1.8)
TSH SERPL DL<=0.05 MIU/L-ACNC: 1.49 UIU/ML (ref 0.27–4.2)

## 2019-03-20 PROCEDURE — 36415 COLL VENOUS BLD VENIPUNCTURE: CPT

## 2019-03-20 PROCEDURE — 84439 ASSAY OF FREE THYROXINE: CPT

## 2019-03-20 PROCEDURE — 84443 ASSAY THYROID STIM HORMONE: CPT

## 2019-03-20 PROCEDURE — 99213 OFFICE O/P EST LOW 20 MIN: CPT | Performed by: NURSE PRACTITIONER

## 2019-03-20 RX ORDER — LEVOTHYROXINE SODIUM 150 MCG
TABLET ORAL
Qty: 90 TABLET | Refills: 1 | Status: SHIPPED | OUTPATIENT
Start: 2019-03-20 | End: 2019-10-07 | Stop reason: SDUPTHER

## 2019-03-20 ASSESSMENT — ENCOUNTER SYMPTOMS
SHORTNESS OF BREATH: 0
CONSTIPATION: 0
COLOR CHANGE: 0
EYE PAIN: 0
BACK PAIN: 0
ABDOMINAL PAIN: 0
DIARRHEA: 0

## 2019-03-22 ENCOUNTER — OFFICE VISIT (OUTPATIENT)
Dept: FAMILY MEDICINE CLINIC | Age: 34
End: 2019-03-22
Payer: COMMERCIAL

## 2019-03-22 VITALS
DIASTOLIC BLOOD PRESSURE: 76 MMHG | TEMPERATURE: 98.5 F | BODY MASS INDEX: 25.53 KG/M2 | SYSTOLIC BLOOD PRESSURE: 102 MMHG | OXYGEN SATURATION: 98 % | HEART RATE: 76 BPM | WEIGHT: 163 LBS

## 2019-03-22 DIAGNOSIS — J01.10 ACUTE NON-RECURRENT FRONTAL SINUSITIS: Primary | ICD-10-CM

## 2019-03-22 PROCEDURE — 99213 OFFICE O/P EST LOW 20 MIN: CPT | Performed by: FAMILY MEDICINE

## 2019-03-22 RX ORDER — AMOXICILLIN AND CLAVULANATE POTASSIUM 875; 125 MG/1; MG/1
1 TABLET, FILM COATED ORAL 2 TIMES DAILY
Qty: 20 TABLET | Refills: 0 | Status: SHIPPED | OUTPATIENT
Start: 2019-03-22 | End: 2019-04-01

## 2019-03-22 ASSESSMENT — PATIENT HEALTH QUESTIONNAIRE - PHQ9
1. LITTLE INTEREST OR PLEASURE IN DOING THINGS: 0
SUM OF ALL RESPONSES TO PHQ9 QUESTIONS 1 & 2: 0
SUM OF ALL RESPONSES TO PHQ QUESTIONS 1-9: 0
SUM OF ALL RESPONSES TO PHQ QUESTIONS 1-9: 0
2. FEELING DOWN, DEPRESSED OR HOPELESS: 0

## 2019-03-22 ASSESSMENT — ENCOUNTER SYMPTOMS: SINUS PRESSURE: 1

## 2019-04-14 ENCOUNTER — HOSPITAL ENCOUNTER (EMERGENCY)
Age: 34
Discharge: HOME | End: 2019-04-15
Attending: EMERGENCY MEDICINE
Payer: COMMERCIAL

## 2019-04-14 DIAGNOSIS — R51.9 ACUTE NONINTRACTABLE HEADACHE, UNSPECIFIED HEADACHE TYPE: ICD-10-CM

## 2019-04-14 DIAGNOSIS — R11.2 NON-INTRACTABLE VOMITING WITH NAUSEA, UNSPECIFIED VOMITING TYPE: Primary | ICD-10-CM

## 2019-04-14 LAB
A/G RATIO: 1.6 (ref 1.1–2.2)
ALBUMIN SERPL-MCNC: 4.4 G/DL (ref 3.4–5)
ALP BLD-CCNC: 67 U/L (ref 40–129)
ALT SERPL-CCNC: 10 U/L (ref 10–40)
ANION GAP SERPL CALCULATED.3IONS-SCNC: 14 MMOL/L (ref 3–16)
AST SERPL-CCNC: 15 U/L (ref 15–37)
BASOPHILS ABSOLUTE: 0 K/UL (ref 0–0.2)
BASOPHILS RELATIVE PERCENT: 0.3 %
BILIRUB SERPL-MCNC: 0.6 MG/DL (ref 0–1)
BUN BLDV-MCNC: 17 MG/DL (ref 7–20)
CALCIUM SERPL-MCNC: 9 MG/DL (ref 8.3–10.6)
CHLORIDE BLD-SCNC: 100 MMOL/L (ref 99–110)
CO2: 23 MMOL/L (ref 21–32)
CREAT SERPL-MCNC: 0.6 MG/DL (ref 0.6–1.1)
EOSINOPHILS ABSOLUTE: 0 K/UL (ref 0–0.6)
EOSINOPHILS RELATIVE PERCENT: 0.2 %
GFR AFRICAN AMERICAN: >60
GFR NON-AFRICAN AMERICAN: >60
GLOBULIN: 2.8 G/DL
GLUCOSE BLD-MCNC: 129 MG/DL (ref 70–99)
HCG QUALITATIVE: NEGATIVE
HCT VFR BLD CALC: 42.1 % (ref 36–48)
HEMOGLOBIN: 14.5 G/DL (ref 12–16)
LYMPHOCYTES ABSOLUTE: 1 K/UL (ref 1–5.1)
LYMPHOCYTES RELATIVE PERCENT: 8.8 %
MCH RBC QN AUTO: 29.6 PG (ref 26–34)
MCHC RBC AUTO-ENTMCNC: 34.5 G/DL (ref 31–36)
MCV RBC AUTO: 85.8 FL (ref 80–100)
MONOCYTES ABSOLUTE: 0.3 K/UL (ref 0–1.3)
MONOCYTES RELATIVE PERCENT: 2.4 %
NEUTROPHILS ABSOLUTE: 9.9 K/UL (ref 1.7–7.7)
NEUTROPHILS RELATIVE PERCENT: 88.3 %
PDW BLD-RTO: 13.4 % (ref 12.4–15.4)
PLATELET # BLD: 123 K/UL (ref 135–450)
PMV BLD AUTO: 9.9 FL (ref 5–10.5)
POTASSIUM SERPL-SCNC: 3.7 MMOL/L (ref 3.5–5.1)
RBC # BLD: 4.91 M/UL (ref 4–5.2)
SODIUM BLD-SCNC: 137 MMOL/L (ref 136–145)
TOTAL PROTEIN: 7.2 G/DL (ref 6.4–8.2)
WBC # BLD: 11.2 K/UL (ref 4–11)

## 2019-04-14 PROCEDURE — 96361 HYDRATE IV INFUSION ADD-ON: CPT

## 2019-04-14 PROCEDURE — 84703 CHORIONIC GONADOTROPIN ASSAY: CPT

## 2019-04-14 PROCEDURE — 85025 COMPLETE CBC W/AUTO DIFF WBC: CPT

## 2019-04-14 PROCEDURE — 80053 COMPREHEN METABOLIC PANEL: CPT

## 2019-04-14 PROCEDURE — 99284 EMERGENCY DEPT VISIT MOD MDM: CPT

## 2019-04-14 PROCEDURE — 6360000002 HC RX W HCPCS: Performed by: EMERGENCY MEDICINE

## 2019-04-14 PROCEDURE — 96375 TX/PRO/DX INJ NEW DRUG ADDON: CPT

## 2019-04-14 PROCEDURE — 2580000003 HC RX 258: Performed by: EMERGENCY MEDICINE

## 2019-04-14 PROCEDURE — 2500000003 HC RX 250 WO HCPCS: Performed by: EMERGENCY MEDICINE

## 2019-04-14 PROCEDURE — 96374 THER/PROPH/DIAG INJ IV PUSH: CPT

## 2019-04-14 RX ORDER — METOCLOPRAMIDE HYDROCHLORIDE 5 MG/ML
10 INJECTION INTRAMUSCULAR; INTRAVENOUS ONCE
Status: COMPLETED | OUTPATIENT
Start: 2019-04-14 | End: 2019-04-14

## 2019-04-14 RX ORDER — 0.9 % SODIUM CHLORIDE 0.9 %
1000 INTRAVENOUS SOLUTION INTRAVENOUS ONCE
Status: COMPLETED | OUTPATIENT
Start: 2019-04-14 | End: 2019-04-15

## 2019-04-14 RX ADMIN — FAMOTIDINE 20 MG: 10 INJECTION, SOLUTION INTRAVENOUS at 23:13

## 2019-04-14 RX ADMIN — METOCLOPRAMIDE 10 MG: 5 INJECTION, SOLUTION INTRAMUSCULAR; INTRAVENOUS at 23:13

## 2019-04-14 RX ADMIN — SODIUM CHLORIDE 1000 ML: 9 INJECTION, SOLUTION INTRAVENOUS at 23:13

## 2019-04-14 ASSESSMENT — PAIN DESCRIPTION - LOCATION: LOCATION: ABDOMEN

## 2019-04-15 VITALS
TEMPERATURE: 97.6 F | RESPIRATION RATE: 16 BRPM | SYSTOLIC BLOOD PRESSURE: 108 MMHG | BODY MASS INDEX: 25.58 KG/M2 | HEART RATE: 83 BPM | OXYGEN SATURATION: 98 % | DIASTOLIC BLOOD PRESSURE: 69 MMHG | HEIGHT: 67 IN | WEIGHT: 163 LBS

## 2019-04-15 PROCEDURE — 96361 HYDRATE IV INFUSION ADD-ON: CPT

## 2019-04-15 PROCEDURE — 2580000003 HC RX 258: Performed by: EMERGENCY MEDICINE

## 2019-04-15 RX ORDER — 0.9 % SODIUM CHLORIDE 0.9 %
1000 INTRAVENOUS SOLUTION INTRAVENOUS ONCE
Status: COMPLETED | OUTPATIENT
Start: 2019-04-15 | End: 2019-04-15

## 2019-04-15 RX ORDER — METOCLOPRAMIDE 10 MG/1
10 TABLET ORAL 4 TIMES DAILY PRN
Qty: 12 TABLET | Refills: 0 | Status: SHIPPED | OUTPATIENT
Start: 2019-04-15 | End: 2020-07-09 | Stop reason: ALTCHOICE

## 2019-04-15 RX ORDER — IBUPROFEN 600 MG/1
600 TABLET ORAL EVERY 8 HOURS PRN
Qty: 30 TABLET | Refills: 0 | Status: SHIPPED | OUTPATIENT
Start: 2019-04-15

## 2019-04-15 RX ADMIN — SODIUM CHLORIDE 1000 ML: 9 INJECTION, SOLUTION INTRAVENOUS at 00:47

## 2019-04-15 NOTE — ED NOTES
Writer in patient room to introduce self. Pt states \"I have one giant favor to ask of you. I need you to call clinical and tell them I will not be at work tomorrow. \" Writer advised patient that she would need to call clinical. Phone and phone number provided for patient. No other needs expressed at this time. Will continue to monitor.       Praveen Owen RN  04/14/19 [id number]

## 2019-04-15 NOTE — ED PROVIDER NOTES
Emergency Physician Note    Chief Complaint  Emesis (patient c/o sudden onset of headache and emesis around 5:30 on her way home from work. pt. states that she has been unable to stop throwing up since then. )       History of Present Illness  Sunny Holt is a 29 y.o. female who presents to the ED for headache and nausea and vomiting. At 5:30 PM patient states she started having a headache that was all over and she states is typical of her migraines. She then had vomiting shortly started after that, the headache resolved just prior to the vomiting. She's had multiple episodes of vomiting all evening until she came to the ER for further evaluation. Patient does admit that she's been extremely dehydrated lately she's been outside a lot and working on family project and has not been keeping up with fluids. She states that she frequently will have syncopal episodes and at one point she felt tingling throughout her entire body and thought she would go syncopized but it did not happen. Her last bowel movement was yesterday. Denies any diarrhea. Denies any photophobia, denies any vision changes. This is not the worse headache of his life and it was not thunderclap in onset. Denies fever, chills, malaise, chest pain, shortness of breath, cough, abdominal pain,  diarrhea, headache, sore throat, dysuria, back pain, rash. No palliative/provocative factors. Positives and pertinent negatives as per HPI. All other of the 10 systems were reviewed and are negative. I have reviewed the following from the nursing documentation:      Prior to Admission medications    Medication Sig Start Date End Date Taking?  Authorizing Provider   metoclopramide (REGLAN) 10 MG tablet Take 1 tablet by mouth 4 times daily as needed (Nausea/Vomiting) 4/15/19  Yes Radha Valdivia MD   ibuprofen (ADVIL;MOTRIN) 600 MG tablet Take 1 tablet by mouth every 8 hours as needed for Pain 4/15/19  Yes Radha Valdivia MD   Multiple Vitamins-Minerals (MULTIVITAL PO) Take by mouth   Yes Historical Provider, MD   SYNTHROID 150 MCG tablet TAKE 1 TABLET BY MOUTH ONE TIME DAILY 3/20/19  Yes ADA Martinez - CNP   Probiotic Product (PROBIOTIC-10) CAPS Take by mouth daily   Yes Historical Provider, MD       Allergies as of 04/14/2019 - Review Complete 04/14/2019   Allergen Reaction Noted    Sulfa antibiotics  08/22/2012    Zofran [ondansetron]  04/24/2018       Past Medical History:   Diagnosis Date    Abnormal Pap smear of cervix     Colposcopy normal; pt states believes was med error-wrong patient    Anemia     during childhood    Hypertension     PIH with 1st pregnancy; no pre-eclampsia-labs all WNL    Hypothyroidism     Polycystic disease, ovaries     TIA (transient ischemic attack) 02/16/2018        Surgical History:   Past Surgical History:   Procedure Laterality Date    SHOULDER SURGERY      left shoulder    WISDOM TOOTH EXTRACTION          Family History:    Family History   Problem Relation Age of Onset    Thyroid Cancer Paternal Grandfather         Metastatic    Thyroid Cancer Maternal Grandfather     Cancer Maternal Grandfather         skin,NMSC    Heart Attack Maternal Grandfather         smoker    High Blood Pressure Maternal Grandfather     High Blood Pressure Maternal Aunt     Miscarriages / Stillbirths Maternal Aunt     Miscarriages / Stillbirths Paternal Aunt     Heart Attack Maternal Grandmother         smoker    High Blood Pressure Maternal Grandmother     Stroke Maternal Grandmother     Asthma Paternal Grandmother         smoker    Osteoporosis Paternal Grandmother     Hearing Loss Paternal Uncle        Social History     Socioeconomic History    Marital status:      Spouse name: Not on file    Number of children: Not on file    Years of education: Not on file    Highest education level: Not on file   Occupational History    Not on file   Social Needs    Financial resource strain: Not on file  Food insecurity:     Worry: Not on file     Inability: Not on file    Transportation needs:     Medical: Not on file     Non-medical: Not on file   Tobacco Use    Smoking status: Never Smoker    Smokeless tobacco: Never Used   Substance and Sexual Activity    Alcohol use: No    Drug use: No    Sexual activity: Yes     Partners: Male   Lifestyle    Physical activity:     Days per week: Not on file     Minutes per session: Not on file    Stress: Not on file   Relationships    Social connections:     Talks on phone: Not on file     Gets together: Not on file     Attends [employer]'s witness service: Not on file     Active member of club or organization: Not on file     Attends meetings of clubs or organizations: Not on file     Relationship status: Not on file    Intimate partner violence:     Fear of current or ex partner: Not on file     Emotionally abused: Not on file     Physically abused: Not on file     Forced sexual activity: Not on file   Other Topics Concern    Not on file   Social History Narrative    Not on file       Nursing notes reviewed. ED Triage Vitals [04/14/19 2224]   Enc Vitals Group      /80      Pulse 83      Resp 16      Temp 97.6 °F (36.4 °C)      Temp Source Oral      SpO2 100 %      Weight 163 lb (73.9 kg)      Height 5' 7\" (1.702 m)      Head Circumference       Peak Flow       Pain Score       Pain Loc       Pain Edu? Excl. in [address]? GENERAL:  Awake, alert. Well developed, well nourished with no apparent distress. HENT:  Normocephalic, Atraumatic, no lacerations. Oropharynx clear, no tonsilar inflammation, no tonsilar exudates, no airway obstruction, moist mucous membranes. Uvula was midline and has symmetric rise. EYES:  Conjunctiva normal, Pupils equal round and reactive to light, extraocular movements normal.  NECK:  Trachea is midline. No stridor. No lymphadenopathy of the anterior cervical chain and no lymphadenopathy of the posterior cervical chain.   No meningeal signs, Supple without JVD. No C-spine tenderness. CHEST:  Regular rate and regular rhythm, no murmurs/rubs/gallops, normal S1/S2, chest wall non-tender. LUNGS:  No respiratory distress. No abdominal retractions, no sternal retractions. Clear to auscultation bilaterally, no wheezing, no rhochi, no rales  ABDOMEN:  Soft, non-tender, non-distended. No guarding and no rebound. No costovertebral angle tenderness to palpation. Normal BS, no organomegaly, no abdominal masses  EXTREMITIES:  Normal range of motion, no edema, no tenderness, no deformity, distal pulses present and equal bilaterally. Moves extremities x4 with purpose. BACK:  No midline tenderness in the cervical, thoracic, and lumbar spine. No deformities, no step-off. Palpation did not elicit any paraspinous tenderness. SKIN: Warm, dry and intact. NEUROLOGIC: Normal mental status. Moving all extremities to command. Alert and oriented x4 without focal deficit or gross sensory deficit. Normal speech. Strength 5/5 in bilateral upper and lower extremities. Sensation is intact in the upper and lower extremities. Cranial Nerves 2-12 are intact.    PSYCHIATRIC: Not anxious, normal mood and affect, thoughts are linear and organized, without delusions/hallucinations, responds appropriately to questions      LABS and DIAGNOSTIC RESULTS    LABS  Results for orders placed or performed during the hospital encounter of 04/14/19   CBC auto differential   Result Value Ref Range    WBC 11.2 (H) 4.0 - 11.0 K/uL    RBC 4.91 4.00 - 5.20 M/uL    Hemoglobin 14.5 12.0 - 16.0 g/dL    Hematocrit 42.1 36.0 - 48.0 %    MCV 85.8 80.0 - 100.0 fL    MCH 29.6 26.0 - 34.0 pg    MCHC 34.5 31.0 - 36.0 g/dL    RDW 13.4 12.4 - 15.4 %    Platelets 314 (L) 906 - 450 K/uL    MPV 9.9 5.0 - 10.5 fL    Neutrophils % 88.3 %    Lymphocytes % 8.8 %    Monocytes % 2.4 %    Eosinophils % 0.2 %    Basophils % 0.3 %    Neutrophils # 9.9 (H) 1.7 - 7.7 K/uL    Lymphocytes # 1.0 1.0 evidence of severe dehydration, surgical abdominal process, severe electrolyte abnormality, toxicity, shock, sepsis, hemodynamic or cardiopulmonary instability, increased intracranial pressure, or any disease process requiring other immediate surgical or further ER medical intervention at this time. I discussed with patient that several dangerous causes of headache, namely subarachnoid hemorrhage and meningitis/encephalitis, could not be diagnosed with any particular imaging study, and thus recommended to them, as I do all severe and/or new headache, a lumbar puncture. Patient expressed understanding of the risks and benefits of lumbar puncture, as well as of the inherit limitations of imaging studies, and declined the procedure of their own volition, accepting the potential risk. This is a pleasant patient with a headache. There are no significant findings indicating subarachnoid hemorrhage, venous sinus thrombosis, epidural hematoma, subdural hematoma, meningitis, encephalitis, glaucoma, temporal arteritis, or other new intracranial, vascular, infectious, ophthalmological, or systemic processes requiring immediate medical or surgical intervention at this time. I do not see indication for labs or imaging studies at this time based on the fact that this is the same headache as this patient has had many times in the past, it is not different in character or quality, it is not the worst headache of their life, and it was not thunderclap in onset. Also, in consideration of SAH, patient does not have high-risk historical features of SAH (Age >40, maximal intensity of headache within 1 hr of onset, worst headache of life, neck pain or stiffness, loss of consciousness, onset during exertion). At the time of d/c home, the patient is ambulatory, pain has completely resolved, vital signs are stable and there are no neurological deficits.      I completed a structured, evidence-based clinical evaluation to screen for subarachnoid hemorrhage. The evidence indicates that the patient is very low risk for [facility] and this is consistent with my clinical intuition. The risk of further workup or hospitalization is likely higher than the risk of the patient having a subarachnoid hemorrhage. It is, therefore, in the patients best interest not to do additional emergent testing. I have discussed with the patient my clinical impression and the result of an evidence-based clinical evaluation to screen for [facility], as well as the risk of further testing and hospitalization. The evidence shows that the risk for subarachnoid hemorrhage is less than 1/200 or 0.5%. Further testing involves either invasive angiogram with about a 1% risk of serious complications, or hospitalization, with about a 1% risk of serious complications. The patient understands the residual risk of SAH and the risk of further testing and declines further emergent evaluation or hospitalization for subarachnoid hemorrhage. It is understood that if the patient is not improving as expected or if other new symptoms or signs of concern develop, other etiologies or diagnoses may need to be considered requiring other tests, treatments, consultations, and/or admission. The diagnosis, plan, expected course, follow-up, and return precautions were discussed and all questions were answered. Final Impression    1. Non-intractable vomiting with nausea, unspecified vomiting type    2. Acute nonintractable headache, unspecified headache type        Blood pressure 108/69, pulse 83, temperature 97.6 °F (36.4 °C), temperature source Oral, resp. rate 16, height 5' 7\" (1.702 m), weight 163 lb (73.9 kg), last menstrual period 03/30/2019, SpO2 98 %, not currently breastfeeding. Patient and/or companions verbalized understanding of the ED workup, any relevant findings as well as any incidental findings, and the disposition and plan.   Questions sought and answered with the patient and/or family members. They voice understanding and agree with plan. If the patient was discharged from the ED, they were instructed to return for any worsening or worrisome concerns. Patient was given scripts for the following medications. I counseled patient how to take these medications. Discharge Medication List as of 4/15/2019 12:44 AM      START taking these medications    Details   metoclopramide (REGLAN) 10 MG tablet Take 1 tablet by mouth 4 times daily as needed (Nausea/Vomiting), Disp-12 tablet, R-0Print      ibuprofen (ADVIL;MOTRIN) 600 MG tablet Take 1 tablet by mouth every 8 hours as needed for Pain, Disp-30 tablet, R-0Print             Disposition  Pt is in stable condition upon Discharge to home. The note was completed using Dragon voice recognition transcription. Every effort was made to ensure accuracy; however, inadvertent transcription errors may be present despite my best efforts to edit errors.     Sujit Cheng MD  [facility]        Sujit Cheng MD  04/15/19 6203

## 2019-05-31 ENCOUNTER — EMPLOYEE WELLNESS (OUTPATIENT)
Dept: OTHER | Age: 34
End: 2019-05-31

## 2019-05-31 LAB
CHOLESTEROL, TOTAL: 155 MG/DL (ref 0–199)
GLUCOSE BLD-MCNC: 86 MG/DL (ref 70–99)
HDLC SERPL-MCNC: 47 MG/DL (ref 40–60)
LDL CHOLESTEROL CALCULATED: 102 MG/DL
TRIGL SERPL-MCNC: 30 MG/DL (ref 0–150)

## 2019-06-03 VITALS — WEIGHT: 163 LBS | BODY MASS INDEX: 25.53 KG/M2

## 2019-10-07 DIAGNOSIS — E06.3 HASHIMOTO'S DISEASE: ICD-10-CM

## 2019-10-08 RX ORDER — LEVOTHYROXINE SODIUM 150 MCG
TABLET ORAL
Qty: 90 TABLET | Refills: 1 | Status: SHIPPED
Start: 2019-10-08 | End: 2020-07-09

## 2020-01-15 ENCOUNTER — TELEPHONE (OUTPATIENT)
Dept: ENDOCRINOLOGY | Age: 35
End: 2020-01-15

## 2020-01-15 RX ORDER — LEVOTHYROXINE SODIUM 0.15 MG/1
150 TABLET ORAL DAILY
Qty: 30 TABLET | Refills: 5 | Status: SHIPPED | OUTPATIENT
Start: 2020-01-15 | End: 2020-07-09 | Stop reason: SDUPTHER

## 2020-07-09 ENCOUNTER — TELEPHONE (OUTPATIENT)
Dept: FAMILY MEDICINE CLINIC | Age: 35
End: 2020-07-09

## 2020-07-09 ENCOUNTER — VIRTUAL VISIT (OUTPATIENT)
Dept: ENDOCRINOLOGY | Age: 35
End: 2020-07-09
Payer: COMMERCIAL

## 2020-07-09 PROCEDURE — 99213 OFFICE O/P EST LOW 20 MIN: CPT | Performed by: NURSE PRACTITIONER

## 2020-07-09 RX ORDER — LEVOTHYROXINE SODIUM 0.15 MG/1
150 TABLET ORAL DAILY
Qty: 90 TABLET | Refills: 1 | Status: SHIPPED | OUTPATIENT
Start: 2020-07-09 | End: 2021-01-13

## 2020-07-09 ASSESSMENT — ENCOUNTER SYMPTOMS
SHORTNESS OF BREATH: 0
COLOR CHANGE: 0
DIARRHEA: 0
ABDOMINAL PAIN: 0
BACK PAIN: 0
CONSTIPATION: 0
EYE PAIN: 0

## 2020-07-09 NOTE — TELEPHONE ENCOUNTER
[facility] received a call from:    Name of Caller: pt    Relationship to patient:self    Organization name:     Best contact number: on file    Reason for call: Pt was basic like annual blood work done

## 2020-07-09 NOTE — TELEPHONE ENCOUNTER
Patient states she is getting blood work done for thyroid tomorrow. She has an appointment with Dr. Hayes Holter for physical on   Future Appointments   Date Time Provider Jo Ann Paul   7/10/2020  3:00 PM [address] [facility]   8/3/2020  7:20 AM Sherri Roland MD [facility]     Patient is asking for routine lab orders from Dr. Hayes Holter tomorrow so she could get \"stuck on once. \"

## 2020-07-09 NOTE — TELEPHONE ENCOUNTER
Left message to return call.  Please schedule her for a yearly physical. Has not been seen since October 2018

## 2020-07-09 NOTE — PROGRESS NOTES
Endocrinology  Maycol Fischer, [place], [address]  [place], [address]  Phone [phone]  Fax [phone]    Elvis Craft is  being evaluated by a Virtual Visit (video visit) encounter to address concerns as mentioned above. Due to this being a TeleHealth encounter (During QRG-72 public health emergency), evaluation of the following organ systems was limited: Vitals/Constitutional/EENT/Resp/CV/GI//MS/Neuro/Skin/Heme-Lymph-Imm. Pursuant to the emergency declaration under the [facility], [address] [place] [address] authority and the [employer] Act, this Virtual Visit was conducted with patient's (and/or legal guardian's) consent, to reduce the patient's risk of exposure to COVID-19 and provide necessary medical care. The patient (and/or legal guardian) has also been advised to contact this office for worsening conditions or problems, and seek emergency medical treatment and/or call 911 if deemed necessary. Services were provided through a video synchronous discussion virtually to substitute for in-person clinic visit. Patient and provider were located at their individual homes    Patient was identified via name,  on the video visit      Elvis Craft is a 30 y.o. female who presents for evaluation of  Hypothyroidism. Last BP Readings:   BP Readings from Last 3 Encounters:   04/15/19 108/69   19 102/76   19 104/70     Last LDL:   Lab Results   Component Value Date    LDLCALC 102 (H) 2019     Aspirin Use: No    Tobacco History:      Smoking status: Never Smoker    Smokeless tobacco: Never Used    Alcohol use No    Drug use: No     Thyroid:   Gloria Arzate has a h/o hypothyroidism for 4+ years. Has been on varying dosages of levothyroxine and is currently on synthroid 150 mcg. Confirms that it is taken in early am on an empty stomach.  Clarified that nothing to eat for at least 30 minutes after and no iron or calcium for at.least 3-4 hours after.      Current symptoms include weight loss ( 67 lbs intentional), amenorrhea (currently lactating) --> now is menstruating  Patient denies denies fatigue, weight changes, heat/cold intolerance, bowel/skin changes or CVS symptoms. Obstructive symptoms  - Change in voice: none  - Trouble swallowing: none    Predisposing factors for thyroid disease  Exposure to radiation (neck): none  Exposure to iodine: none  FH of thyroid disease: MGF (total thyroidectomy), MGM also total thyroidectomy,   FH of thyroid or other cancers: PGF had extensive metastases     Lab Results   Component Value Date    TSH 1.49 03/20/2019    TSH 5.77 08/01/2018    TSH 1.15 11/06/2017    FT3 2.6 08/07/2017    FT3 2.4 06/28/2017    T4FREE 1.7 03/20/2019    T4FREE 1.6 08/01/2018    T4FREE 1.2 08/07/2017     EXAMINATION:THYROID ULTRASOUND    9/10/2018    COMPARISON:10/16/2013    HISTORY:  ORDERING SYSTEM PROVIDED HISTORY: Thyroid nodule  TECHNOLOGIST PROVIDED HISTORY:  Ordering Physician Provided Reason for Exam: thyroid nodule  Acuity: Unknown    FINDINGS:  Right thyroid lobe:  5.8 x 1.5 x 1.5 cm  Left thyroid lobe:  4.8 x 1.5 x 1.4 cm  Isthmus:  0.3 cm  Thyroid Gland:  The thyroid gland is diffusely heterogeneous in echotexture and mildly hyperemic, which is unchanged. Nodules: A 0.4 cm macrocalcification is redemonstrated in the inferior left thyroid lobe, unchanged since at least 10/16/2013.  No other thyroid nodules are identified. Cervical lymphadenopathy: No abnormal lymph nodes in the imaged portions of the neck. Impression     1.  Diffusely heterogeneous and mildly hyperemic thyroid gland, which can be seen with thyroiditis.  Correlation with thyroid lab values recommended. 2.  Unchanged 0.4 cm macrocalcification in the inferior left thyroid lobe.  No discrete solid thyroid nodules aside from this.  This has been stable since at least 10/16/2013 MG tablet Take 1 tablet by mouth every 8 hours as needed for Pain 30 tablet 0    Multiple Vitamins-Minerals (MULTIVITAL PO) Take by mouth      Probiotic Product (PROBIOTIC-10) CAPS Take by mouth daily       No current facility-administered medications for this visit. Review of Systems   Constitutional: Negative for activity change, appetite change, diaphoresis, fatigue and unexpected weight change. Eyes: Negative for pain and visual disturbance. Respiratory: Negative for shortness of breath. Cardiovascular: Negative for palpitations and leg swelling. Gastrointestinal: Negative for abdominal pain, constipation and diarrhea. Endocrine: Negative for cold intolerance, heat intolerance, polydipsia, polyphagia and polyuria. Musculoskeletal: Negative for back pain, gait problem, myalgias and neck pain. Skin: Negative for color change and pallor. Neurological: Negative for dizziness, weakness, light-headedness and headaches. Hematological: Does not bruise/bleed easily. Psychiatric/Behavioral: Negative for sleep disturbance. The patient is not nervous/anxious and is not hyperactive. There were no vitals filed for this visit.  televisit    Physical Exam   televisit    Assessment  Nathan Mujica is a 33 y.o. female with Hypothyroidism, MNG, Hashimoto disease    Plan  Problem List Items Addressed This Visit     Thyroid nodule     Repeat US Thyroid  Microcalcification in subcentimeter nodule         Relevant Medications    levothyroxine (SYNTHROID) 150 MCG tablet    Other Relevant Orders    US Head Neck Soft Tissue Thyroid    Hashimoto's disease - Primary     Repeat TFT  Will titrate dose based on results  Refills at current dose sent         Relevant Medications    levothyroxine (SYNTHROID) 150 MCG tablet    Other Relevant Orders    T4, Free    TSH without Reflex    T3, Free    RESOLVED: Hypothyroidism    Relevant Medications    levothyroxine (SYNTHROID) 150 MCG tablet      Greater than 25 minutes spent directly counseling patient about topics listed above (such as lifestyle modifications, preventative screenings and/or disease related processes). Return in about 6 months (around 1/9/2021).

## 2020-07-24 ENCOUNTER — HOSPITAL ENCOUNTER (OUTPATIENT)
Age: 35
Discharge: HOME | End: 2020-07-24
Payer: COMMERCIAL

## 2020-07-24 ENCOUNTER — HOSPITAL ENCOUNTER (OUTPATIENT)
Dept: ULTRASOUND IMAGING | Age: 35
Discharge: HOME | End: 2020-07-24
Payer: COMMERCIAL

## 2020-07-24 LAB
A/G RATIO: 1.6 (ref 1.1–2.2)
ALBUMIN SERPL-MCNC: 4.2 G/DL (ref 3.4–5)
ALP BLD-CCNC: 49 U/L (ref 40–129)
ALT SERPL-CCNC: 14 U/L (ref 10–40)
ANION GAP SERPL CALCULATED.3IONS-SCNC: 10 MMOL/L (ref 3–16)
AST SERPL-CCNC: 16 U/L (ref 15–37)
BILIRUB SERPL-MCNC: 0.3 MG/DL (ref 0–1)
BUN BLDV-MCNC: 19 MG/DL (ref 7–20)
CALCIUM SERPL-MCNC: 8.8 MG/DL (ref 8.3–10.6)
CHLORIDE BLD-SCNC: 103 MMOL/L (ref 99–110)
CHOLESTEROL, TOTAL: 158 MG/DL (ref 0–199)
CO2: 25 MMOL/L (ref 21–32)
CREAT SERPL-MCNC: 0.9 MG/DL (ref 0.6–1.1)
GFR AFRICAN AMERICAN: >60
GFR NON-AFRICAN AMERICAN: >60
GLOBULIN: 2.7 G/DL
GLUCOSE BLD-MCNC: 99 MG/DL (ref 70–99)
HCT VFR BLD CALC: 44.3 % (ref 36–48)
HDLC SERPL-MCNC: 53 MG/DL (ref 40–60)
HEMOGLOBIN: 15.1 G/DL (ref 12–16)
LDL CHOLESTEROL CALCULATED: NORMAL MG/DL
LDL CHOLESTEROL DIRECT: 100 MG/DL
MCH RBC QN AUTO: 30 PG (ref 26–34)
MCHC RBC AUTO-ENTMCNC: 34.1 G/DL (ref 31–36)
MCV RBC AUTO: 87.9 FL (ref 80–100)
PDW BLD-RTO: 12.8 % (ref 12.4–15.4)
PLATELET # BLD: 146 K/UL (ref 135–450)
PMV BLD AUTO: 10.8 FL (ref 5–10.5)
POTASSIUM SERPL-SCNC: 4.4 MMOL/L (ref 3.5–5.1)
RBC # BLD: 5.04 M/UL (ref 4–5.2)
SODIUM BLD-SCNC: 138 MMOL/L (ref 136–145)
T3 FREE: 2.5 PG/ML (ref 2.3–4.2)
T4 FREE: 2 NG/DL (ref 0.9–1.8)
TOTAL PROTEIN: 6.9 G/DL (ref 6.4–8.2)
TRIGL SERPL-MCNC: 25 MG/DL (ref 0–150)
TSH SERPL DL<=0.05 MIU/L-ACNC: 2.16 UIU/ML (ref 0.27–4.2)
VLDLC SERPL CALC-MCNC: NORMAL MG/DL
WBC # BLD: 5.7 K/UL (ref 4–11)

## 2020-07-24 PROCEDURE — 84443 ASSAY THYROID STIM HORMONE: CPT

## 2020-07-24 PROCEDURE — 36415 COLL VENOUS BLD VENIPUNCTURE: CPT

## 2020-07-24 PROCEDURE — 80061 LIPID PANEL: CPT

## 2020-07-24 PROCEDURE — 84439 ASSAY OF FREE THYROXINE: CPT

## 2020-07-24 PROCEDURE — 84481 FREE ASSAY (FT-3): CPT

## 2020-07-24 PROCEDURE — 76536 US EXAM OF HEAD AND NECK: CPT

## 2020-07-24 PROCEDURE — 80053 COMPREHEN METABOLIC PANEL: CPT

## 2020-07-24 PROCEDURE — 85027 COMPLETE CBC AUTOMATED: CPT

## 2020-08-03 ENCOUNTER — VIRTUAL VISIT (OUTPATIENT)
Dept: FAMILY MEDICINE CLINIC | Age: 35
End: 2020-08-03
Payer: COMMERCIAL

## 2020-08-03 PROCEDURE — 99395 PREV VISIT EST AGE 18-39: CPT | Performed by: INTERNAL MEDICINE

## 2020-08-03 NOTE — PROGRESS NOTES
8/3/2020    TELEHEALTH EVALUATION -- Audio/Visual (During EZVJP-32 public health emergency)    HPI:    Ariana Meraz (:  1985) has requested an audio/video evaluation for the following concern(s):    History and Physical      Ariana Meraz  YOB: 1985    Date of Service:  8/3/2020    Chief Complaint:   Ariana Meraz is a 30 y.o. female who presents for complete physical examination.     HPI: Annual  Exam           Hashimoto's disease    Wt Readings from Last 3 Encounters:   19 163 lb (73.9 kg)   19 163 lb (73.9 kg)   19 163 lb (73.9 kg)     BP Readings from Last 3 Encounters:   04/15/19 108/69   19 102/76   19 104/70       Patient Active Problem List   Diagnosis    Thyroid nodule    Hashimoto's disease    Gestational hypertension    Postinfectious hypothyroidism    [facility]ocytopenia affecting [facility] ([facility]    Benign gestational thrombocytopenia in third trimester ([facility]     (normal spontaneous vaginal delivery)    Amaurosis fugax    Migraine with aura and without status migrainosus, not intractable    Hypercoagulable state ([address]       Preventive Care:  Health Maintenance   Topic Date Due    Varicella vaccine (1 of 2 - 2-dose childhood series) 1987    Flu vaccine (1) 2020    TSH testing  2021    Potassium monitoring  2021    Creatinine monitoring  2021    Cervical cancer screen  2025    DTaP/Tdap/Td vaccine (3 - Td) 2027    HIV screen  Completed    Hepatitis A vaccine  Aged Out    Hepatitis B vaccine  Aged Out    Hib vaccine  Aged Out    Meningococcal (ACWY) vaccine  Aged Out    Pneumococcal 0-64 years Vaccine  Aged Out      Hx abnormal PAP: no  Sexual activity: single partner, contraception -    Self-breast exams: yes  Previous DEXA scan: no  Last eye exam:  unknown, , no eye issues  Exercise: no regular exercise  Seatbelt use: yes  Lipid panel:   Lab Results   Component Value Date    CHOL 158 07/24/2020    TRIG 25 07/24/2020    HDL 53 07/24/2020    LDLCALC see below 07/24/2020    LDLDIRECT 100 (H) 07/24/2020        Advance Directive: N, Not Received    Immunization History   Administered Date(s) Administered    Influenza Vaccine, unspecified formulation 10/01/2016    Influenza Virus Vaccine 10/01/2014, 09/15/2017, 09/27/2018, 10/01/2019    Influenza, [facility], IM, (6 mo and older Fluzone, Flulaval, Fluarix and 3 yrs and older Afluria) 10/06/2018    Tdap (Boostrix, Adacel) 11/26/2015, 09/05/2017       Allergies   Allergen Reactions    Sulfa Antibiotics      Passed out    Zofran [Ondansetron]      Does not like how it makes her feel     No outpatient medications have been marked as taking for the 8/3/20 encounter (Appointment) with Chuck Jordan MD.       Past Medical History:   Diagnosis Date    Abnormal Pap smear of cervix     Colposcopy normal; pt states believes was med error-wrong patient    Anemia     during childhood    Hypertension     PIH with 1st pregnancy; no pre-eclampsia-labs all WNL    Hypothyroidism     Polycystic disease, ovaries     TIA (transient ischemic attack) 02/16/2018     Past Surgical History:   Procedure Laterality Date    SHOULDER SURGERY      left shoulder    WISDOM TOOTH EXTRACTION       Family History   Problem Relation Age of Onset    Thyroid Cancer Paternal Grandfather         Metastatic    Thyroid Cancer Maternal Grandfather     Cancer Maternal Grandfather         skin,NMSC    Heart Attack Maternal Grandfather         smoker    High Blood Pressure Maternal Grandfather     High Blood Pressure Maternal Aunt     Miscarriages / Stillbirths Maternal Aunt     Miscarriages / Stillbirths Paternal Aunt     Heart Attack Maternal Grandmother         smoker    High Blood Pressure Maternal Grandmother     Stroke Maternal Grandmother     Asthma Paternal Grandmother         smoker    Osteoporosis Paternal Grandmother     Hearing Loss Paternal Uncle Social History     Socioeconomic History    Marital status:      Spouse name: Not on file    Number of children: Not on file    Years of education: Not on file    Highest education level: Not on file   Occupational History    Not on file   Social Needs    Financial resource strain: Not on file    Food insecurity     Worry: Not on file     Inability: Not on file    Transportation needs     Medical: Not on file     Non-medical: Not on file   Tobacco Use    Smoking status: Never Smoker    Smokeless tobacco: Never Used   Substance and Sexual Activity    Alcohol use: No    Drug use: No    Sexual activity: Yes     Partners: Male   Lifestyle    Physical activity     Days per week: Not on file     Minutes per session: Not on file    Stress: Not on file   Relationships    Social connections     Talks on phone: Not on file     Gets together: Not on file     Attends Sikhism service: Not on file     Active member of club or organization: Not on file     Attends meetings of clubs or organizations: Not on file     Relationship status: Not on file    Intimate partner violence     Fear of current or ex partner: Not on file     Emotionally abused: Not on file     Physically abused: Not on file     Forced sexual activity: Not on file   Other Topics Concern    Not on file   Social History Narrative    Not on file       Review of Systems:  A comprehensive review of systems was negative except for what was noted in the HPI. Prior to Visit Medications    Medication Sig Taking?  Authorizing Provider   Aspirin-Acetaminophen-Caffeine (EXCEDRIN MIGRAINE PO) Take by mouth  Historical Provider, MD   levothyroxine (SYNTHROID) 150 MCG tablet Take 1 tablet by mouth daily  ADA Myers - CNP   ibuprofen (ADVIL;MOTRIN) 600 MG tablet Take 1 tablet by mouth every 8 hours as needed for Pain  Nasim Quijano MD   Multiple Vitamins-Minerals (MULTIVITAL PO) Take by mouth  Historical Provider, MD   Probiotic Product (PROBIOTIC-10) CAPS Take by mouth daily  Historical Provider, MD       Social History     Tobacco Use    Smoking status: Never Smoker    Smokeless tobacco: Never Used   Substance Use Topics    Alcohol use: No    Drug use: No            PHYSICAL EXAMINATION:    Vital Signs: (As obtained by patient/caregiver or practitioner observation)    Blood pressure-no blood pressure issue, last check a year ago was normal      Constitutional: [x] Appears well-developed and well-nourished [x] No apparent distress      [] Abnormal-   Mental status  [x] Alert and awake  [x] Oriented to person/place/time []Able to follow commands      Eyes:  EOM    [x]  Normal  [] Abnormal-  Sclera  []  Normal  [] Abnormal -         Discharge []  None visible  [] Abnormal -    HENT:   [x] Normocephalic, atraumatic. [] Abnormal   [] Mouth/Throat: Mucous membranes are moist.     External Ears [] Normal  [] Abnormal-     Neck: [x] No visualized mass     Pulmonary/Chest: [x] Respiratory effort normal.  [x] No visualized signs of difficulty breathing or respiratory distress        [] Abnormal-      Musculoskeletal:   [x] Normal gait with no signs of ataxia         [x] Normal range of motion of neck        [] Abnormal-       Neurological:        [x] No Facial Asymmetry (Cranial nerve 7 motor function) (limited exam to video visit)          [x] No gaze palsy        [] Abnormal-         Skin:        [x] No significant exanthematous lesions or discoloration noted on facial skin         [] Abnormal-            Psychiatric:       [x] Normal Affect [x] No Hallucinations        [] Abnormal-          ASSESSMENT/PLAN:     Diagnosis Orders   1. Well adult exam     2. Hashimoto's disease       Cbc, CMP, Lipid panel. T4, TSH  Continue  levothyroxine as managed by    endocrinology. Jayde Galan is a 30 y.o. female being evaluated by a Virtual Visit (video visit) encounter to address concerns as mentioned above.   A caregiver was present when appropriate. Due to this being a TeleHealth encounter (During YIMQD-77 public health emergency), evaluation of the following organ systems was limited: Vitals/Constitutional/EENT/Resp/CV/GI//MS/Neuro/Skin/Heme-Lymph-Imm. Pursuant to the emergency declaration under the [address] [place] [address], [address] [place] [address] and the [employer] Act, this Virtual Visit was conducted with patient's (and/or legal guardian's) consent, to reduce the patient's risk of exposure to COVID-19 and provide necessary medical care. The patient (and/or legal guardian) has also been advised to contact this office for worsening conditions or problems, and seek emergency medical treatment and/or call 911 if deemed necessary. Patient identification was verified at the start of the visit: Yes    Total time spent on this encounter: Not billed by time    Services were provided through a video synchronous discussion virtually to substitute for in-person clinic visit. Patient and provider were located at their individual homes. --Chuck Jordan MD on 8/3/2020 at 7:30 AM    An electronic signature was used to authenticate this note.

## 2020-09-30 ENCOUNTER — NURSE TRIAGE (OUTPATIENT)
Dept: OTHER | Facility: CLINIC | Age: 35
End: 2020-09-30

## 2020-09-30 NOTE — TELEPHONE ENCOUNTER
Received call from Kenneth Bernardo in [facility]. [facility] [address] [facility]. Attention Provider: Thank you for allowing me to participate in the care of your patient. The  patient was connected to triage in response to information provided to the [facility]. Please do not respond through this encounter as the response is not directed to a shared pool. Reason for Disposition   Vomiting is the main symptom   MILD to MODERATE vomiting (e.g., 1-5 times/day) and lasts > 48 hours (2 days)    Answer Assessment - Initial Assessment Questions  1. DESCRIPTION: \"Describe your dizziness. \"      Can focus, fells like on a wave   2. LIGHTHEADED: \"Do you feel lightheaded? \" (e.g., somewhat faint, woozy, weak upon standing)      Lightheaded, feels due to inability to eat or drink   3. VERTIGO: \"Do you feel like either you or the room is spinning or tilting? \" (i.e. vertigo)      Denies   4. SEVERITY: \"How bad is it? \"  \"Do you feel like you are going to faint? \" \"Can you stand and walk? \"    - MILD - walking normally    - MODERATE - interferes with normal activities (e.g., work, school)     - SEVERE - unable to stand, requires support to walk, feels like passing out now. Mild   5. ONSET:  \"When did the dizziness begin? \"      Yesterday  6. AGGRAVATING FACTORS: \"Does anything make it worse? \" (e.g., standing, change in head position)      Turning her head  7. HEART RATE: \"Can you tell me your heart rate? \" \"How many beats in 15 seconds? \"  (Note: not all patients can do this)        Normal  8. CAUSE: \"What do you think is causing the dizziness? \"      Ear ache, vomiting, has history of perforated ear drum years ago   9. RECURRENT SYMPTOM: \"Have you had dizziness before? \" If so, ask: \"When was the last time? \" \"What happened that time? \"      Gets car sick   10. OTHER SYMPTOMS: \"Do you have any other symptoms? \" (e.g., fever, chest pain, vomiting, diarrhea, bleeding) Ear pain, vomiting   11. PREGNANCY: \"Is there any chance you are pregnant? \" \"When was your last menstrual period? \"        Denies    Protocols used: MQAOUESIC-ZLXRS-IN, VOMITING-ADULT-OH

## 2020-10-01 ENCOUNTER — OFFICE VISIT (OUTPATIENT)
Dept: FAMILY MEDICINE CLINIC | Age: 35
End: 2020-10-01
Payer: COMMERCIAL

## 2020-10-01 VITALS
HEART RATE: 61 BPM | WEIGHT: 143 LBS | BODY MASS INDEX: 22.4 KG/M2 | DIASTOLIC BLOOD PRESSURE: 64 MMHG | OXYGEN SATURATION: 98 % | RESPIRATION RATE: 18 BRPM | SYSTOLIC BLOOD PRESSURE: 116 MMHG | TEMPERATURE: 97.7 F

## 2020-10-01 PROCEDURE — 99213 OFFICE O/P EST LOW 20 MIN: CPT | Performed by: NURSE PRACTITIONER

## 2020-10-01 RX ORDER — FLUTICASONE PROPIONATE 50 MCG
SPRAY, SUSPENSION (ML) NASAL
Qty: 1 BOTTLE | Refills: 0 | Status: SHIPPED | OUTPATIENT
Start: 2020-10-01 | End: 2020-10-14

## 2020-10-01 RX ORDER — AMOXICILLIN 500 MG/1
500 CAPSULE ORAL 3 TIMES DAILY
Qty: 21 CAPSULE | Refills: 0 | Status: SHIPPED | OUTPATIENT
Start: 2020-10-01 | End: 2020-10-08

## 2020-10-01 RX ORDER — FLUCONAZOLE 150 MG/1
150 TABLET ORAL ONCE
Qty: 1 TABLET | Refills: 0 | Status: SHIPPED | OUTPATIENT
Start: 2020-10-01 | End: 2020-10-01

## 2020-10-01 ASSESSMENT — PATIENT HEALTH QUESTIONNAIRE - PHQ9
SUM OF ALL RESPONSES TO PHQ9 QUESTIONS 1 & 2: 0
1. LITTLE INTEREST OR PLEASURE IN DOING THINGS: 0
SUM OF ALL RESPONSES TO PHQ QUESTIONS 1-9: 0
SUM OF ALL RESPONSES TO PHQ QUESTIONS 1-9: 0
2. FEELING DOWN, DEPRESSED OR HOPELESS: 0

## 2020-10-01 ASSESSMENT — ENCOUNTER SYMPTOMS
RESPIRATORY NEGATIVE: 1
SORE THROAT: 0
SINUS PRESSURE: 0
GASTROINTESTINAL NEGATIVE: 1
EYES NEGATIVE: 1

## 2020-10-01 NOTE — PROGRESS NOTES
10/1/2020    This is a 30 y.o. female   Chief Complaint   Patient presents with    Otalgia     x 3 weeks. left ear. states her 3year old child screamed into her ear and it has been painful since then. gradually worsening. states she has been laying on a heating pad. yesterday, she was riding in the car (motion sickness) and vomited   . Telma Watkins is here for evaluation of otalgia. It started 3 weeks ago and has been slowly worsening. Her two year old has screamed in her ear. Since that time the pain has been worsening. Yesterday she started having motion sickness and vomited. She has been using a heating pad but this has not improved. Patient Active Problem List   Diagnosis    Thyroid nodule    Hashimoto's disease    Gestational hypertension    Postinfectious hypothyroidism    Thrombocytopenia affecting pregnancy ([address]    Benign gestational thrombocytopenia in third trimester ([facility]     (normal spontaneous vaginal delivery)    Amaurosis fugax    Migraine with aura and without status migrainosus, not intractable    Hypercoagulable state (HCC)       Current Outpatient Medications   Medication Sig Dispense Refill    Aspirin-Acetaminophen-Caffeine (EXCEDRIN MIGRAINE PO) Take by mouth      levothyroxine (SYNTHROID) 150 MCG tablet Take 1 tablet by mouth daily 90 tablet 1    Multiple Vitamins-Minerals (MULTIVITAL PO) Take by mouth      Probiotic Product (PROBIOTIC-10) CAPS Take by mouth daily      ibuprofen (ADVIL;MOTRIN) 600 MG tablet Take 1 tablet by mouth every 8 hours as needed for Pain (Patient not taking: Reported on 10/1/2020) 30 tablet 0     No current facility-administered medications for this visit.         Allergies   Allergen Reactions    Sulfa Antibiotics      Passed out    Zofran [Ondansetron]      Does not like how it makes her feel       /64 (Site: Left Upper Arm, Position: Sitting)   Pulse 61   Temp 97.7 °F (36.5 °C) (Infrared)   Resp 18   Wt 143 lb (64.9 kg)   SpO2 98%   Breastfeeding No   BMI 22.40 kg/m²     Social History     Tobacco Use    Smoking status: Never Smoker    Smokeless tobacco: Never Used   Substance Use Topics    Alcohol use: No       Review of Systems   Constitutional: Negative for activity change, appetite change, fatigue and fever. HENT: Positive for ear pain, postnasal drip and tinnitus. Negative for congestion, sinus pressure and sore throat. Eyes: Negative. Respiratory: Negative. Cardiovascular: Negative. Gastrointestinal: Negative. Musculoskeletal: Negative. Skin: Negative for rash. Allergic/Immunologic: Positive for environmental allergies. Neurological: Positive for dizziness and headaches. Physical Exam  Vitals signs and nursing note reviewed. Constitutional:       General: She is not in acute distress. Appearance: She is well-developed. She is not diaphoretic. HENT:      Head: Normocephalic and atraumatic. Right Ear: Ear canal and external ear normal. No decreased hearing noted. No middle ear effusion. Tympanic membrane is injected. Tympanic membrane is not bulging. Left Ear: Ear canal and external ear normal. Decreased hearing noted. A middle ear effusion (yellow) is present. Tympanic membrane is injected and bulging. Nose: Mucosal edema present. No rhinorrhea. Right Turbinates: Swollen. Left Turbinates: Swollen. Mouth/Throat:      Pharynx: No oropharyngeal exudate. Eyes:      General:         Right eye: No discharge. Left eye: No discharge. Conjunctiva/sclera: Conjunctivae normal.   Neck:      Musculoskeletal: Normal range of motion and neck supple. Cardiovascular:      Rate and Rhythm: Normal rate and regular rhythm. Heart sounds: Normal heart sounds. No murmur. No friction rub. No gallop. Pulmonary:      Effort: Pulmonary effort is normal. No respiratory distress. Breath sounds: Normal breath sounds. No wheezing or rales.    Abdominal: General: Bowel sounds are normal.      Palpations: Abdomen is soft. Musculoskeletal: Normal range of motion. Lymphadenopathy:      Cervical: No cervical adenopathy. Skin:     General: Skin is warm and dry. Neurological:      General: No focal deficit present. Mental Status: She is alert and oriented to person, place, and time. Motor: No abnormal muscle tone. Coordination: Coordination normal.   Psychiatric:         Mood and Affect: Mood normal.         Behavior: Behavior normal.         Thought Content: Thought content normal.         Judgment: Judgment normal.         Diagnosis       ICD-10-CM    1. Non-recurrent acute suppurative otitis media of left ear without spontaneous rupture of tympanic membrane  H66.002 amoxicillin (AMOXIL) 500 MG capsule     fluconazole (DIFLUCAN) 150 MG tablet     fluticasone (FLONASE) 50 MCG/ACT nasal spray        Plan    Start amoxicillin  Start flonase  Diflucan for after treatment due to history of yeast infection after antibiotics  Push fluids and reviewed supportive care    No orders of the defined types were placed in this encounter. Orders Placed This Encounter   Medications    amoxicillin (AMOXIL) 500 MG capsule     Sig: Take 1 capsule by mouth 3 times daily for 7 days     Dispense:  21 capsule     Refill:  0    fluconazole (DIFLUCAN) 150 MG tablet     Sig: Take 1 tablet by mouth once for 1 dose     Dispense:  1 tablet     Refill:  0    fluticasone (FLONASE) 50 MCG/ACT nasal spray     Sig: One spray each nostril twice daily     Dispense:  1 Bottle     Refill:  0       Patient Education:  otalgia    Return if symptoms worsen or fail to improve.

## 2020-10-01 NOTE — PATIENT INSTRUCTIONS
Patient Education        Ear Infection (Otitis Media): Care Instructions  Your Care Instructions     An ear infection may start with a cold and affect the middle ear (otitis media). It can hurt a lot. Most ear infections clear up on their own in a couple of days. Most often you will not need antibiotics. This is because many ear infections are caused by a virus. Antibiotics don't work against a virus. Regular doses of pain medicines are the best way to reduce your fever and help you feel better. Follow-up care is a key part of your treatment and safety. Be sure to make and go to all appointments, and call your doctor if you are having problems. It's also a good idea to know your test results and keep a list of the medicines you take. How can you care for yourself at home? · Take pain medicines exactly as directed. ? If the doctor gave you a prescription medicine for pain, take it as prescribed. ? If you are not taking a prescription pain medicine, take an over-the-counter medicine, such as acetaminophen (Tylenol), ibuprofen (Advil, Motrin), or naproxen (Aleve). Read and follow all instructions on the label. ? Do not take two or more pain medicines at the same time unless the doctor told you to. Many pain medicines have acetaminophen, which is Tylenol. Too much acetaminophen (Tylenol) can be harmful. · Plan to take a full dose of pain reliever before bedtime. Getting enough sleep will help you get better. · Try a warm, moist washcloth on the ear. It may help relieve pain. · If your doctor prescribed antibiotics, take them as directed. Do not stop taking them just because you feel better. You need to take the full course of antibiotics. When should you call for help? Call your doctor now or seek immediate medical care if:  · You have new or increasing ear pain. · You have new or increasing pus or blood draining from your ear. · You have a fever with a stiff neck or a severe headache.   Watch closely for

## 2020-10-08 ENCOUNTER — TELEPHONE (OUTPATIENT)
Dept: FAMILY MEDICINE CLINIC | Age: 35
End: 2020-10-08

## 2020-10-08 RX ORDER — NEOMYCIN SULFATE, POLYMYXIN B SULFATE AND HYDROCORTISONE 10; 3.5; 1 MG/ML; MG/ML; [USP'U]/ML
4 SUSPENSION/ DROPS AURICULAR (OTIC) 3 TIMES DAILY
Qty: 10 ML | Refills: 0 | Status: SHIPPED | OUTPATIENT
Start: 2020-10-08 | End: 2020-10-18

## 2020-10-08 NOTE — TELEPHONE ENCOUNTER
Patient called. She had 2 days left of antibiotics, she was seen last week by   Aida Spivey. The antibiotic doesn't seem to be helping. She is asking if Dr. Curry Dunbar would prescribe ear drops? ?    Pharmacy is [facility]

## 2020-10-14 ENCOUNTER — PROCEDURE VISIT (OUTPATIENT)
Dept: AUDIOLOGY | Age: 35
End: 2020-10-14
Payer: COMMERCIAL

## 2020-10-14 ENCOUNTER — OFFICE VISIT (OUTPATIENT)
Dept: ENT CLINIC | Age: 35
End: 2020-10-14
Payer: COMMERCIAL

## 2020-10-14 VITALS
TEMPERATURE: 98.4 F | HEART RATE: 71 BPM | SYSTOLIC BLOOD PRESSURE: 111 MMHG | BODY MASS INDEX: 25.52 KG/M2 | WEIGHT: 162.6 LBS | DIASTOLIC BLOOD PRESSURE: 80 MMHG | HEIGHT: 67 IN | RESPIRATION RATE: 16 BRPM

## 2020-10-14 PROCEDURE — 99203 OFFICE O/P NEW LOW 30 MIN: CPT | Performed by: STUDENT IN AN ORGANIZED HEALTH CARE EDUCATION/TRAINING PROGRAM

## 2020-10-14 PROCEDURE — 92557 COMPREHENSIVE HEARING TEST: CPT | Performed by: AUDIOLOGIST

## 2020-10-14 PROCEDURE — 92567 TYMPANOMETRY: CPT | Performed by: AUDIOLOGIST

## 2020-10-14 ASSESSMENT — ENCOUNTER SYMPTOMS
COUGH: 0
RHINORRHEA: 0
SHORTNESS OF BREATH: 0
VOMITING: 0
NAUSEA: 0
EYE PAIN: 0

## 2020-10-14 NOTE — Clinical Note
Dr. Beverly Mar,    Thank you for your referral for audiometric testing on this patient. Please see the scanned audiogram (under \"Media\" tab) and encounter note for details. If you have any questions, or if there is anything else you need, please let me know.       Azalea Lawson  Audiologist  ---  [address]  [facility] - Audiology

## 2020-10-14 NOTE — PROGRESS NOTES
Mony      Patient Name: [address] Record Number:  [id number]  Primary Care Physician: Martina Jones MD  Date of Consultation: 10/14/2020    Chief Complaint:   Chief Complaint   Patient presents with    Ear Problem     Patient states that she was diagnosed at her PCPs office, has been on ear drop ATBs for 4 days, and had 7 days of oral ATBs. States that DX was about 2 weeks ago. Patient states that she is still having symptoms. Ear is popping, states it feels like fluid is in it.  Dizziness     States that she is dizzy with nausea    Otalgia     States that ear is painful, stopped ear drops due to reading \"can cause hearing loss\" on bottle. Is having drainage. HISTORY OF PRESENT ILLNESS  Rodríguez Khan is a(n) 30 y.o. female who presents with left-sided ear pain and hearing loss. This is been present for approximately 1 week. She was started on Augmentin for 7 days which she had some improvement and then started on Cortisporin drops following the oral antibiotics. She did have some dizziness but within the last 2 to 3 days it has slowly improved. She denies any recurrent ear infections or otologic symptoms. She denies any ear discharge or drainage.       Patient Active Problem List   Diagnosis    Thyroid nodule    Hashimoto's disease    Gestational hypertension    Postinfectious hypothyroidism    Thrombocytopenia affecting pregnancy ([address]    Benign gestational thrombocytopenia in third trimester ([address]     (normal spontaneous vaginal delivery)    Amaurosis fugax    Migraine with aura and without status migrainosus, not intractable    Hypercoagulable state ([address]     Past Surgical History:   Procedure Laterality Date    SHOULDER SURGERY      left shoulder    WISDOM TOOTH EXTRACTION       Family History   Problem Relation Age of Onset    Thyroid Cancer Paternal Grandfather         Metastatic    Thyroid Cancer Maternal Grandfather     Cancer Maternal Grandfather         skin,NMSC    Heart Attack Maternal Grandfather         smoker    High Blood Pressure Maternal Grandfather     High Blood Pressure Maternal Aunt     Miscarriages / Stillbirths Maternal Aunt     Miscarriages / Stillbirths Paternal Aunt     Heart Attack Maternal Grandmother         smoker    High Blood Pressure Maternal Grandmother     Stroke Maternal Grandmother     Asthma Paternal Grandmother         smoker    Osteoporosis Paternal Grandmother     Hearing Loss Paternal Uncle      Social History     Socioeconomic History    Marital status:      Spouse name: Not on file    Number of children: Not on file    Years of education: Not on file    Highest education level: Not on file   Occupational History    Not on file   Social Needs    Financial resource strain: Not on file    Food insecurity     Worry: Not on file     Inability: Not on file   [employer] needs     Medical: Not on file     Non-medical: Not on file   Tobacco Use    Smoking status: Never Smoker    Smokeless tobacco: Never Used   Substance and Sexual Activity    Alcohol use: No    Drug use: No    Sexual activity: Yes     Partners: Male   Lifestyle    Physical activity     Days per week: Not on file     Minutes per session: Not on file    Stress: Not on file   Relationships    Social connections     Talks on phone: Not on file     Gets together: Not on file     Attends Sabianism service: Not on file     Active member of club or organization: Not on file     Attends meetings of clubs or organizations: Not on file     Relationship status: Not on file    Intimate partner violence     Fear of current or ex partner: Not on file     Emotionally abused: Not on file     Physically abused: Not on file     Forced sexual activity: Not on file   Other Topics Concern    Not on file   Social History Narrative    Not on file       DRUG/FOOD ALLERGIES: Sulfa antibiotics and Zofran [ondansetron]    CURRENT MEDICATIONS  Prior to Admission medications    Medication Sig Start Date End Date Taking? Authorizing Provider   Aspirin-Acetaminophen-Caffeine (EXCEDRIN MIGRAINE PO) Take by mouth   Yes Historical Provider, MD   levothyroxine (SYNTHROID) 150 MCG tablet Take 1 tablet by mouth daily 7/9/20  Yes ADA Kumar CNP   ibuprofen (ADVIL;MOTRIN) 600 MG tablet Take 1 tablet by mouth every 8 hours as needed for Pain 4/15/19  Yes Shannan Ornelas MD   Multiple Vitamins-Minerals (MULTIVITAL PO) Take by mouth   Yes Historical Provider, MD   Probiotic Product (PROBIOTIC-10) CAPS Take by mouth daily   Yes Historical Provider, MD   neomycin-polymyxin-hydrocortisone (CORTISPORIN) 3.5-02296-9 otic suspension Place 4 drops in ear(s) 3 times daily for 10 days  Patient not taking: Reported on 10/14/2020 10/8/20 10/18/20  ADA Craft CNP       REVIEW OF SYSTEMS  The following systems were reviewed and revealed the following in addition to any already discussed in the HPI:    Review of Systems   Constitutional: Negative for fatigue and fever. HENT: Positive for ear pain and hearing loss. Negative for congestion, ear discharge, postnasal drip, rhinorrhea, sneezing and tinnitus. Eyes: Negative for pain and visual disturbance. Respiratory: Negative for cough and shortness of breath. Cardiovascular: Negative for chest pain. Gastrointestinal: Negative for nausea and vomiting. Endocrine: Negative. Genitourinary: Negative. Musculoskeletal: Negative for neck pain and neck stiffness. Skin: Negative for rash. Neurological: Negative for dizziness and headaches. Psychiatric/Behavioral: Negative.            PHYSICAL EXAM  /80 (Site: Right Upper Arm, Position: Sitting, Cuff Size: Medium Adult)   Pulse 71   Temp 98.4 °F (36.9 °C) (Infrared)   Resp 16   Ht 5' 7\" (1.702 m)   Wt 162 lb 9.6 oz (73.8 kg)   BMI 25.47 kg/m²     GENERAL: No Acute Distress, Alert and tests  Review / order radiology tests  Decision to obtain old records

## 2020-10-14 NOTE — PROGRESS NOTES
Natacha Tellez   1985, 30 y.o. female   [id number]       Referring Provider: Lala Trevizo DO  Referral Type: In an order in [address] [place] [address]    Reason for Visit: Evaluation of the cause of disorders of hearing, tinnitus, and balance. ADULT AUDIOLOGIC EVALUATION      Natacha Tellez is a 30 y.o. female seen today, 10/14/2020 , for an initial audiologic evaluation. Patient was seen by Lala Trevizo DO prior to today's evaluation. AUDIOLOGIC AND OTHER PERTINENT MEDICAL HISTORY:      Natacha Tellez noted otalgia and decreased hearing of the left ear following concerns for ear infection that started 2 months ago with reportedly no improvement with previous medical management. She notes episodic dizziness described as room-spinning that lasts seconds to minutes, is often triggered by movement, and also started about 2 months ago. Patient also reports history of non-bothersome tinnitus, bilaterally prior to onset of otalgia and decreased hearing and family history of hearing loss in father reportedly secondary to perforated ear drums and in grandfather. No additional significant otologic or medical history was reported. Natacha Tellez denied aural fullness, otorrhea, history of falls, history of occupational/recreational noise exposure, history of head trauma and history of ear surgery. Date: 10/14/2020     IMPRESSIONS:      Today's results revealed hearing within normal limits with excellent word recognition, bilaterally. Discussed use of tinnitus management strategies. Follow medical recommendations of Young Kelley DO.    ASSESSMENT AND FINDINGS:     Otoscopy revealed: Clear ear canals bilaterally    RIGHT EAR:  Hearing Sensitivity:Within normal limits. Speech Recognition Threshold: 5 dB HL  Word Recognition: Excellent (100%), based on NU-6 25-word list at 50 dBHL using recorded speech stimuli. Tympanometry: Normal peak pressure and compliance, Type A tympanogram, consistent with normal middle ear function. LEFT EAR:  Hearing Sensitivity: Within normal limits. Speech Recognition Threshold: 10 dB HL  Word Recognition: Excellent (100%), based on NU-6 25-word list at 50 dBHL using recorded speech stimuli. Tympanometry: Normal peak pressure and compliance, Type A tympanogram, consistent with normal middle ear function. Reliability: Good  Transducer: Inserts    See scanned audiogram dated 10/14/2020  for results. PATIENT EDUCATION:       The following items were discussed with the patient:    - Good Communication Strategies   - Tinnitus Management Strategies    - Dizziness    Educational information was shared in the After Visit Summary. RECOMMENDATIONS:                                                                                                                                                                                                                                                            The following items are recommended based on patient report and results from today's appointment:   - Continue medical follow-up with [facility] Staff, DO.   - Retest hearing as medically indicated and/or sooner if a change in hearing is noted. - Utilize \"Good Communication Strategies\" as discussed to assist in speech understanding with communication partners. - Maintain a sound enriched environment to assist in the management of tinnitus symptoms. - If medically indicated, consider vestibular evaluation to further investigate symptoms of dizziness.        Azalea Sandy  Audiologist    Chart CC'd to: [facility] Staff, DO      Degree of   Hearing Sensitivity dB Range   Within Normal Limits (WNL) 0 - 20   Mild 20 - 40   Moderate 40 - 55   Moderately-Severe 55 - 70   Severe 70 - 90   Profound 90 +

## 2020-10-14 NOTE — PATIENT INSTRUCTIONS
part of your treatment and safety. Be sure to make and go to all appointments, and call your doctor if you are having problems. It's also a good idea to know your test results and keep a list of the medicines you take. How can you care for yourself at home? · Limit or cut out alcohol, caffeine, and sodium. They can make your symptoms worse. · Do not smoke or use other tobacco products. Nicotine reduces blood flow to the ear and makes tinnitus worse. If you need help quitting, talk to your doctor about stop-smoking programs and medicines. These can increase your chances of quitting for good. · Talk to your doctor about whether to stop taking aspirin and similar products such as ibuprofen or naproxen. · Get exercise often. It can help improve blood flow to the ear. Ways to manage/cope with tinnitus  Some tinnitus may last a long time. To manage your tinnitus, try to:  · Avoid noises that you think caused your tinnitus. If you can't avoid loud noises, wear earplugs or earmuffs. · Ignore the sound by paying attention to other things. Keeping your brain busy with other tasks or background noise can help your brain not focus on the tinnitus. · Try to not give the tinnitus an emotional reaction. Do your best to ignore the sound and not let it bother you. Relax using biofeedback, meditation, or yoga. Feeling stressed and being tired can make tinnitus worse. · Play music or white noise to help you sleep. Background noise may cover up the noise that you hear in your ears. You can buy a tabletop machine or a device that sits under your pillow to play soothing sounds, like ocean waves. · Smart phones have free apps, such as Whist, Relax Melodies, ReSound Relief, and White Noise Lite. These apps have different types of sounds/noise, some of which you can blend together to find sounds that are most soothing to you.   · Hearing aid technology, especially when there is some hearing loss, may help reduce tinnitus symptoms by giving your brain better access to the sounds it is missing. There are some hearing aids with built-in noise generator programs, which may help when amplification alone is not enough. Additional resources may be found through the American [employer]inn[employer] Association at www.jareth.org    When should you call for help? Call 911 anytime you think you may need emergency care. For example, call if:    · You have symptoms of a stroke. These may include:  ? Sudden numbness, tingling, weakness, or loss of movement in your face, arm, or leg, especially on only one side of your body. ? Sudden vision changes. ? Sudden trouble speaking. ? Sudden confusion or trouble understanding simple statements. ? Sudden problems with walking or balance. ? A sudden, severe headache that is different from past headaches. Call your doctor now or seek immediate medical care if:    · You develop other symptoms. These may include hearing loss (or worse hearing loss), balance problems, dizziness, nausea, or vomiting. Watch closely for changes in your health, and be sure to contact your doctor if:    · Your tinnitus moves from both ears to one ear. · Your hearing loss gets worse within 1 day after an ear injury. · Your tinnitus or hearing loss does not get better within 1 week after an ear injury. · Your tinnitus bothers you enough that you want to take medicines to help you cope with it. If you notice changes in your tinnitus and/or your hearing, it is recommended that you have your hearing tested by your audiologist and to follow-up with your physician that manages your hearing loss (such as your ENT or Primary Care doctor). Dizziness: Care Instructions  Your Care Instructions  Dizziness is the feeling of unsteadiness or fuzziness in your head. It is different than having vertigo, which is a feeling that the room is spinning or that you are moving or falling.  It is also different from lightheadedness, feeling in the back, neck, jaw, or upper belly or in one or both shoulders or arms. ? Lightheadedness or sudden weakness. ? A fast or irregular heartbeat. · You have symptoms of a stroke. These may include:  ? Sudden numbness, tingling, weakness, or loss of movement in your face, arm, or leg, especially on only one side of your body. ? Sudden vision changes. ? Sudden trouble speaking. ? Sudden confusion or trouble understanding simple statements. ? Sudden problems with walking or balance. ? A sudden, severe headache that is different from past headaches. Call your doctor now or seek immediate medical care if:    · You feel dizzy and have a fever, headache, or ringing in your ears. · You have new or increased nausea and vomiting. · Your dizziness does not go away or comes back. Watch closely for changes in your health, and be sure to contact your doctor if:    · You do not get better as expected. Where can you learn more? Go to https://[employer](IN[employer]).[employer]. org and sign in to your EXPO account. Enter L419 in the [employer] box to learn more about \"Dizziness: Care Instructions. \"     If you do not have an account, please click on the \"Sign Up Now\" link. Current as of: September 23, 2018  Content Version: 11.9  © 6295-7280 [employer]. Care instructions adapted under license by [facility] ([facility]). If you have questions about a medical condition or this instruction, always ask your healthcare professional. [place] [address] any warranty or liability for your use of this information.

## 2021-01-13 DIAGNOSIS — E06.3 HASHIMOTO'S DISEASE: ICD-10-CM

## 2021-01-13 RX ORDER — LEVOTHYROXINE SODIUM 0.15 MG/1
150 TABLET ORAL DAILY
Qty: 90 TABLET | Refills: 0 | Status: SHIPPED
Start: 2021-01-13 | End: 2021-04-23 | Stop reason: DRUGHIGH

## 2021-01-13 NOTE — TELEPHONE ENCOUNTER
Medication:   Requested Prescriptions     Pending Prescriptions Disp Refills    levothyroxine (SYNTHROID) 150 MCG tablet [Pharmacy Med Name: LEVOTHYROXINE SODIUM 150MCG TABS] 90 tablet 1     Sig: TAKE 1 TABLET BY MOUTH DAILY       Last appt: 7/9/2020   Next appt: Visit date not found    Last Thyroid:   Lab Results   Component Value Date    TSH 2.16 07/24/2020    FT3 2.5 07/24/2020    N0GTSCB 1.68 08/07/2017    T4FREE 2.0 07/24/2020    Q3MPQRY 12.2 08/07/2017

## 2021-01-17 ENCOUNTER — HOSPITAL ENCOUNTER (OUTPATIENT)
Age: 36
Discharge: HOME | End: 2021-01-17
Payer: COMMERCIAL

## 2021-01-18 ENCOUNTER — OFFICE VISIT (OUTPATIENT)
Dept: ENT CLINIC | Age: 36
End: 2021-01-18
Payer: COMMERCIAL

## 2021-01-18 VITALS — BODY MASS INDEX: 25.43 KG/M2 | TEMPERATURE: 97.1 F | WEIGHT: 162 LBS | HEIGHT: 67 IN

## 2021-01-18 DIAGNOSIS — R42 VERTIGO: ICD-10-CM

## 2021-01-18 DIAGNOSIS — H69.82 DYSFUNCTION OF LEFT EUSTACHIAN TUBE: Primary | ICD-10-CM

## 2021-01-18 PROBLEM — H69.92 DYSFUNCTION OF LEFT EUSTACHIAN TUBE: Status: ACTIVE | Noted: 2021-01-18

## 2021-01-18 PROCEDURE — 99214 OFFICE O/P EST MOD 30 MIN: CPT | Performed by: STUDENT IN AN ORGANIZED HEALTH CARE EDUCATION/TRAINING PROGRAM

## 2021-01-18 RX ORDER — METHYLPREDNISOLONE 4 MG/1
TABLET ORAL
Qty: 1 KIT | Refills: 0 | Status: SHIPPED | OUTPATIENT
Start: 2021-01-18 | End: 2021-01-24

## 2021-01-18 ASSESSMENT — ENCOUNTER SYMPTOMS
VOMITING: 0
EYE PAIN: 0
NAUSEA: 0
COUGH: 0
SHORTNESS OF BREATH: 0
RHINORRHEA: 0

## 2021-01-18 NOTE — PROGRESS NOTES
Mony      Patient Name: [address] Record Number:  [id number]  Primary Care Physician: Yahaira Farris MD  Date of Consultation: 2021    Chief Complaint:   Chief Complaint   Patient presents with    Otalgia     Left ear, she states her symptoms have not improved since her last office visit. HISTORY OF PRESENT ILLNESS  Timoteo Gilliland is a(n) 30 y.o. female who presents with left-sided otalgia. She was seen approximately 3 months ago for otalgia. She had an audiogram at that time which did not show any abnormalities. She continues to have pain on that left side. It is sharp. She denies any changes in hearing. She does have some vertiginous episodes associated with this pain. She is able to clear her ears occasionally but does not feel that the left side fully clears. She does have occasional bouts of vertigo that have started at the same time as her eustachian tube dysfunction.     Patient Active Problem List   Diagnosis    Thyroid nodule    Hashimoto's disease    Gestational hypertension    Postinfectious hypothyroidism    Thrombocytopenia affecting pregnancy ([address]    Benign gestational thrombocytopenia in third trimester ([address]     (normal spontaneous vaginal delivery)    Amaurosis fugax    Migraine with aura and without status migrainosus, not intractable    Hypercoagulable state ([address]    Dysfunction of left eustachian tube     Past Surgical History:   Procedure Laterality Date    SHOULDER SURGERY      left shoulder    WISDOM TOOTH EXTRACTION       Family History   Problem Relation Age of Onset    Thyroid Cancer Paternal Grandfather         Metastatic    Thyroid Cancer Maternal Grandfather     Cancer Maternal Grandfather         skin,NMSC    Heart Attack Maternal Grandfather         smoker    High Blood Pressure Maternal Grandfather     High Blood Pressure Maternal Aunt     Miscarriages /

## 2021-03-22 NOTE — PROGRESS NOTES
opthalmologist  Who  Did not find anything wrong with her eyes. Viison restored after about 20 minutes    . She had several imaging done. MRI of the head w/o contrast- which was negative. CTA of theNeck with and without contrast- No flow significant stenosis, aneurysm or dissection  CTA of the head- which was also negative findings  CTA angio Head  w and w/o contrast- negative for   Stenosis, aneurysm or dissection    Patient is scheduled to be seen by neurology and hematology. Chief Complaint   Patient presents with    Follow-Up from Hospital     KEIKO SOMERS 2/17/18     History of Present illness - Follow up of Hospital diagnosis(es):   Patient is a 33year old female with             PMH of hypothyroidism, PCOS, migraines , heart murmur , 3 months postpartum who presented with  Sudden visual loss of the left eye which resolved 20 minutes later.      Non face to face  following discharge, date last encounter closed (first attempt may have been earlier): *No documented post hospital discharge outreach found in the last 14 days *No documented post hospital discharge outreach found in the last 14 days    Call initiated 2 business days of discharge: *No response recorded in the last 14 days     Interval history/Current status: stable    Physical Exam:  General Appearance: alert and oriented to person, place and time, well developed and well- nourished, in no acute distress  Skin: warm and dry, no rash or erythema  Head: normocephalic and atraumatic  Eyes: pupils equal, round, and reactive to light, extraocular eye movements intact, conjunctivae normal  ENT: tympanic membrane, external ear and ear canal normal bilaterally, nose without deformity, nasal mucosa and turbinates normal without polyps  Neck: supple and non-tender without mass, no thyromegaly or thyroid nodules, no cervical lymphadenopathy  Pulmonary/Chest: clear to auscultation bilaterally- no wheezes, rales or rhonchi, normal air movement, no respiratory distress  Cardiovascular: normal rate, regular rhythm, normal S1 and S2, no murmurs, rubs, clicks, or gallops, distal pulses intact, no carotid bruits  Abdomen: soft, non-tender, non-distended, normal bowel sounds, no masses or organomegaly  Breast: appear normal   Extremities: no cyanosis, clubbing or edema  Musculoskeletal: normal range of motion, no joint swelling, deformity or tenderness  Neurologic: reflexes normal and symmetric, no cranial nerve deficit, gait, coordination and speech normal    Assessment/Plan:   TIA- advised to take aspirin   Hypothyroidism-take synthroid 150 mcg daily AM    Medical Decision Making: high complexity [phone]

## 2021-03-29 ENCOUNTER — OFFICE VISIT (OUTPATIENT)
Dept: FAMILY MEDICINE CLINIC | Age: 36
End: 2021-03-29
Payer: COMMERCIAL

## 2021-03-29 ENCOUNTER — TELEPHONE (OUTPATIENT)
Dept: ENDOCRINOLOGY | Age: 36
End: 2021-03-29

## 2021-03-29 VITALS
WEIGHT: 167.6 LBS | OXYGEN SATURATION: 98 % | SYSTOLIC BLOOD PRESSURE: 128 MMHG | TEMPERATURE: 98.4 F | HEART RATE: 64 BPM | RESPIRATION RATE: 20 BRPM | DIASTOLIC BLOOD PRESSURE: 82 MMHG | BODY MASS INDEX: 26.25 KG/M2

## 2021-03-29 DIAGNOSIS — R21 SKIN RASH: Primary | ICD-10-CM

## 2021-03-29 PROCEDURE — 99213 OFFICE O/P EST LOW 20 MIN: CPT | Performed by: INTERNAL MEDICINE

## 2021-03-29 RX ORDER — METHYLPREDNISOLONE 4 MG/1
TABLET ORAL
Qty: 1 KIT | Refills: 0 | Status: SHIPPED | OUTPATIENT
Start: 2021-03-29 | End: 2021-04-04

## 2021-03-29 RX ORDER — ALUMINUM HYDROXIDE, MAGNESIUM HYDROXIDE, DIMETHICONE 400; 400; 40 MG/5ML; MG/5ML; MG/5ML
1 LIQUID ORAL DAILY
COMMUNITY

## 2021-03-29 ASSESSMENT — PATIENT HEALTH QUESTIONNAIRE - PHQ9
SUM OF ALL RESPONSES TO PHQ QUESTIONS 1-9: 0
2. FEELING DOWN, DEPRESSED OR HOPELESS: 0

## 2021-03-29 NOTE — PROGRESS NOTES
America Chong (:  1985) is a 30 y.o. female,Established patient, here for evaluation of the following chief complaint(s):  Rash (on arms,torso, and legs-yard work X 5 days)      ASSESSMENT/PLAN:  1. Skin rash    - methylPREDNISolone (MEDROL DOSEPACK) 4 MG tablet; Take by mouth. Dispense: 1 kit; Refill: 0      SUBJECTIVE/OBJECTIVE:  HPI     Skin rash- for the past 5 days. , mostly on her  Arms and a few areas on the legs. She had been  Working in the yard and has been picking up woods and all kinds of materials trying to clean the yard and lifting woods with both arms although  She did wear long sleeves and long parts. Review of Systems -unremarkable  Except  For   What is noted in the HPI    Physical Exam  Constitutional:       Appearance: Normal appearance. HENT:      Head: Normocephalic. Skin:     Findings: Erythema and rash present. Comments: Erythematous  And itchy rash over both upper  Extremities and  A few spots on both legs, faint erythematous rash over the left upper qudrant, non noted on her back   Neurological:      Mental Status: She is alert. Psychiatric:         Mood and Affect: Mood normal.         Behavior: Behavior normal.     Return  If  rash  Worsen or fail to improve. An electronic signature was used to authenticate this note.     --Petey Izaguirre MD

## 2021-03-29 NOTE — TELEPHONE ENCOUNTER
Patient would like to get her labs done. She would like to lower the dose. She is having some symptoms.

## 2021-03-30 DIAGNOSIS — E04.1 THYROID NODULE: ICD-10-CM

## 2021-03-30 DIAGNOSIS — E06.3 HYPOTHYROIDISM DUE TO HASHIMOTO'S THYROIDITIS: Primary | ICD-10-CM

## 2021-03-30 DIAGNOSIS — E03.8 HYPOTHYROIDISM DUE TO HASHIMOTO'S THYROIDITIS: Primary | ICD-10-CM

## 2021-04-02 ENCOUNTER — TELEPHONE (OUTPATIENT)
Dept: FAMILY MEDICINE CLINIC | Age: 36
End: 2021-04-02

## 2021-04-02 NOTE — TELEPHONE ENCOUNTER
Patient informed her poison ivy was spreading. She wanted steroid shot today. I informed her Dr. Kirsty Raza was not here and office closes at 1:00 pm. She called at 12:58 pm.     Patient said she will go to urgent care.

## 2021-04-20 ENCOUNTER — HOSPITAL ENCOUNTER (OUTPATIENT)
Age: 36
Discharge: HOME | End: 2021-04-20
Payer: COMMERCIAL

## 2021-04-20 DIAGNOSIS — E03.8 HYPOTHYROIDISM DUE TO HASHIMOTO'S THYROIDITIS: ICD-10-CM

## 2021-04-20 DIAGNOSIS — E06.3 HYPOTHYROIDISM DUE TO HASHIMOTO'S THYROIDITIS: ICD-10-CM

## 2021-04-20 DIAGNOSIS — E04.1 THYROID NODULE: ICD-10-CM

## 2021-04-20 PROCEDURE — 36415 COLL VENOUS BLD VENIPUNCTURE: CPT

## 2021-04-20 PROCEDURE — 84443 ASSAY THYROID STIM HORMONE: CPT

## 2021-04-20 PROCEDURE — 84481 FREE ASSAY (FT-3): CPT

## 2021-04-20 PROCEDURE — 84439 ASSAY OF FREE THYROXINE: CPT

## 2021-04-21 LAB
T3 FREE: 2.7 PG/ML (ref 2.3–4.2)
T4 FREE: 1.4 NG/DL (ref 0.9–1.8)
TSH SERPL DL<=0.05 MIU/L-ACNC: 3.01 UIU/ML (ref 0.27–4.2)

## 2021-04-22 ENCOUNTER — TELEPHONE (OUTPATIENT)
Dept: ENDOCRINOLOGY | Age: 36
End: 2021-04-22

## 2021-04-22 NOTE — TELEPHONE ENCOUNTER
Patient needs to talk to jaden regarding to her synthroid. She stated she is having some issues and cutting her own dose down.   Please call patient to discuss

## 2021-04-23 DIAGNOSIS — E03.8 HYPOTHYROIDISM DUE TO HASHIMOTO'S THYROIDITIS: Primary | ICD-10-CM

## 2021-04-23 DIAGNOSIS — E06.3 HYPOTHYROIDISM DUE TO HASHIMOTO'S THYROIDITIS: Primary | ICD-10-CM

## 2021-04-23 RX ORDER — LEVOTHYROXINE SODIUM 88 UG/1
88 TABLET ORAL DAILY
Qty: 90 TABLET | Refills: 1 | Status: SHIPPED | OUTPATIENT
Start: 2021-04-23 | End: 2021-11-09 | Stop reason: SDUPTHER

## 2021-04-23 RX ORDER — LEVOTHYROXINE SODIUM 88 UG/1
88 TABLET ORAL DAILY
Qty: 90 TABLET | Refills: 1 | Status: SHIPPED | OUTPATIENT
Start: 2021-04-23 | End: 2021-04-23 | Stop reason: SDUPTHER

## 2021-04-23 NOTE — TELEPHONE ENCOUNTER
Called patient  She has cut dose in half per self due to significant  Tachycardia and anxiety, was taking 150 mcg every other day  Labs after 1.5 months on this dose are in range, but weight going up.  Previous symptoms of hyperthyroidism resolved  Will refill at 88 mcg and labs after about 2 months on this dose

## 2021-11-08 ENCOUNTER — TELEPHONE (OUTPATIENT)
Dept: ENDOCRINOLOGY | Age: 36
End: 2021-11-08

## 2021-11-08 DIAGNOSIS — E06.3 HYPOTHYROIDISM DUE TO HASHIMOTO'S THYROIDITIS: ICD-10-CM

## 2021-11-08 DIAGNOSIS — E03.8 HYPOTHYROIDISM DUE TO HASHIMOTO'S THYROIDITIS: ICD-10-CM

## 2021-11-08 DIAGNOSIS — E04.2 MULTIPLE THYROID NODULES: Primary | ICD-10-CM

## 2021-11-08 RX ORDER — LEVOTHYROXINE SODIUM 88 UG/1
TABLET ORAL
Qty: 90 TABLET | Refills: 1 | OUTPATIENT
Start: 2021-11-08

## 2021-11-09 RX ORDER — LEVOTHYROXINE SODIUM 88 UG/1
88 TABLET ORAL DAILY
Qty: 90 TABLET | Refills: 1 | Status: SHIPPED | OUTPATIENT
Start: 2021-11-09 | End: 2022-01-06 | Stop reason: SDUPTHER

## 2021-12-01 ENCOUNTER — HOSPITAL ENCOUNTER (OUTPATIENT)
Dept: ULTRASOUND IMAGING | Age: 36
Discharge: HOME | End: 2021-12-01
Payer: COMMERCIAL

## 2021-12-01 ENCOUNTER — HOSPITAL ENCOUNTER (OUTPATIENT)
Age: 36
Discharge: HOME | End: 2021-12-01
Payer: COMMERCIAL

## 2021-12-01 DIAGNOSIS — E04.2 MULTIPLE THYROID NODULES: ICD-10-CM

## 2021-12-01 PROCEDURE — 84439 ASSAY OF FREE THYROXINE: CPT

## 2021-12-01 PROCEDURE — 36415 COLL VENOUS BLD VENIPUNCTURE: CPT

## 2021-12-01 PROCEDURE — 76536 US EXAM OF HEAD AND NECK: CPT

## 2021-12-01 PROCEDURE — 84443 ASSAY THYROID STIM HORMONE: CPT

## 2021-12-02 LAB
T4 FREE: 1.7 NG/DL (ref 0.9–1.8)
TSH SERPL DL<=0.05 MIU/L-ACNC: 3.32 UIU/ML (ref 0.27–4.2)

## 2021-12-07 ENCOUNTER — TELEPHONE (OUTPATIENT)
Dept: ENDOCRINOLOGY | Age: 36
End: 2021-12-07

## 2021-12-07 NOTE — TELEPHONE ENCOUNTER
Called pt to inform about the labs\\ pt was advised that she needed to get an ultrasound done to get refills on medication\\ pt was advised that they wouldn't need another due to the results being b9\\ With that being the case the pt did have to pay out of pocket $400 for this\\ pt is an employee within [employer] and would like a call back in regards to this.

## 2022-01-06 ENCOUNTER — VIRTUAL VISIT (OUTPATIENT)
Dept: ENDOCRINOLOGY | Age: 37
End: 2022-01-06
Payer: COMMERCIAL

## 2022-01-06 DIAGNOSIS — E03.8 HYPOTHYROIDISM DUE TO HASHIMOTO'S THYROIDITIS: Primary | ICD-10-CM

## 2022-01-06 DIAGNOSIS — E06.3 HASHIMOTO'S DISEASE: ICD-10-CM

## 2022-01-06 DIAGNOSIS — E06.3 HYPOTHYROIDISM DUE TO HASHIMOTO'S THYROIDITIS: Primary | ICD-10-CM

## 2022-01-06 DIAGNOSIS — E04.1 THYROID NODULE: ICD-10-CM

## 2022-01-06 PROCEDURE — 99213 OFFICE O/P EST LOW 20 MIN: CPT | Performed by: NURSE PRACTITIONER

## 2022-01-06 RX ORDER — LEVOTHYROXINE SODIUM 88 UG/1
88 TABLET ORAL DAILY
Qty: 90 TABLET | Refills: 2 | Status: SHIPPED | OUTPATIENT
Start: 2022-01-06 | End: 2022-05-03 | Stop reason: SDUPTHER

## 2022-01-06 ASSESSMENT — ENCOUNTER SYMPTOMS
ABDOMINAL PAIN: 0
CONSTIPATION: 0
EYE PAIN: 0
BACK PAIN: 0
SHORTNESS OF BREATH: 0
COLOR CHANGE: 0
DIARRHEA: 0

## 2022-01-06 NOTE — ASSESSMENT & PLAN NOTE
Reviewed thyroid US  No further follow up for nodule at this time as stable since 2013  May repeat if new symptoms noted

## 2022-01-06 NOTE — ASSESSMENT & PLAN NOTE
Repeat labs in 6 mos or earlier if new symptoms present  Discussed co relation of medication dose to weight fluctuations  Reports being asymptomatic @ 88 mcg and to continue

## 2022-01-06 NOTE — PROGRESS NOTES
Endocrinology  Scott Murray, CNP, [address]  [place], [address]  Phone [phone]  Fax [phone]    Loan Mon is  being evaluated by a Virtual Visit (video visit) encounter to address concerns as mentioned above. Due to this being a TeleHealth encounter (During [place] [address] public health emergency), evaluation of the following organ systems was limited: Vitals/Constitutional/EENT/Resp/CV/GI//MS/Neuro/Skin/Heme-Lymph-Imm. Pursuant to the emergency declaration under the [facility], [address] [place] [address] authority and the [employer] Act, this Virtual Visit was conducted with patient's (and/or legal guardian's) consent, to reduce the patient's risk of exposure to COVID-19 and provide necessary medical care. The patient (and/or legal guardian) has also been advised to contact this office for worsening conditions or problems, and seek emergency medical treatment and/or call 911 if deemed necessary. Services were provided through a video synchronous discussion virtually to substitute for in-person clinic visit. Patient and provider were located at their individual homes    Patient was identified via name,  on the video visit      Loan Mon is a 29 y.o. female who presents for evaluation of  Hypothyroidism. 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 Has been on varying dosages of levothyroxine and is currently on synthroid 88 mcg.  (decreased from 150 mcg re symptoms of palpitations/anxiety/fatigue)  Confirms that it is taken in early am on an empty stomach. Clarified that nothing to eat for at least 30 minutes after and no iron or calcium for at.least 3-4 hours after.      Current symptoms include weight loss ( 67 lbs intentional), amenorrhea (currently lactating) --> now is menstruating  Patient denies denies fatigue, weight changes, heat/cold intolerance, bowel/skin changes or CVS symptoms. Obstructive symptoms  - Change in voice: none  - Trouble swallowing: none    Predisposing factors for thyroid disease  Exposure to radiation (neck): none  Exposure to iodine: none  FH of thyroid disease: MGF (total thyroidectomy), MGM also total thyroidectomy,   FH of thyroid or other cancers: PGF had extensive metastases     Lab Results   Component Value Date    TSH 3.32 12/01/2021    TSH 3.01 04/20/2021    TSH 2.16 07/24/2020    FT3 2.7 04/20/2021    FT3 2.5 07/24/2020    FT3 2.6 08/07/2017    T4FREE 1.7 12/01/2021    T4FREE 1.4 04/20/2021    T4FREE 2.0 07/24/2020     EXAMINATION:THYROID ULTRASOUND    9/10/2018    COMPARISON:10/16/2013    HISTORY:  ORDERING SYSTEM PROVIDED HISTORY: Thyroid nodule  TECHNOLOGIST PROVIDED HISTORY:  Ordering Physician Provided Reason for Exam: thyroid nodule  Acuity: Unknown    FINDINGS:  Right thyroid lobe:  5.8 x 1.5 x 1.5 cm  Left thyroid lobe:  4.8 x 1.5 x 1.4 cm  Isthmus:  0.3 cm  Thyroid Gland:  The thyroid gland is diffusely heterogeneous in echotexture and mildly hyperemic, which is unchanged. Nodules: A 0.4 cm macrocalcification is redemonstrated in the inferior left thyroid lobe, unchanged since at least 10/16/2013.  No other thyroid nodules are identified. Cervical lymphadenopathy: No abnormal lymph nodes in the imaged portions of the neck. Impression     1.  Diffusely heterogeneous and mildly hyperemic thyroid gland, which can be seen with thyroiditis.  Correlation with thyroid lab values recommended.     2.  Unchanged 0.4 cm macrocalcification in the inferior left thyroid lobe. No discrete solid thyroid nodules aside from this.  This has been stable since at least 10/16/2013 and presumed benign.  No specific imaging follow-up recommended. 8/8/2017    EXAMINATION:THYROID ULTRASOUND    8/8/2017    COMPARISON:12/11/2015    HISTORY:ORDERING PHYSICIAN PROVIDED HISTORY: Nontoxic nodular goiter    FINDINGS:  Right thyroid lobe:  5.6 x 1.5 x 1.5 cm    Left thyroid lobe:  4.7 x 1.5 x 1.6 cm    Isthmus:  3 mm    Thyroid Gland:  Thyroid gland demonstrates heterogeneous echotexture and vascularity. Nodules: No change in the 5 mm calcification in the left inferior gland. Cervical lymphadenopathy: No abnormal lymph nodes in the imaged portions of the neck. Impression   1. Nonspecific heterogeneous thyroid echotexture       EXAMINATION:THYROID ULTRASOUND     12/1/2021     COMPARISON:07/24/2020, 09/10/2018, 08/08/2017, 12/11/2015, 10/16/2013, 08/09/2012     HISTORY:  ORDERING SYSTEM PROVIDED HISTORY: Multiple thyroid nodules  TECHNOLOGIST PROVIDED HISTORY:  Reason for Exam: multiple thyroid nodules  Acuity: Unknown  Type of Exam: Unknown  Relevant Medical/Surgical History: US 7/24/20     Re-evaluate thyroid nodules.     FINDINGS:  Right thyroid lobe:  Measures 5.4 x 1.5 x 1.7 cm     Left thyroid lobe:  Measures 4.9 x 1.5 x 1.4 cm     Isthmus:  Measures 1.5 mm     Thyroid Gland: The thyroid parenchyma is heterogeneous in echogenicity and  demonstrates normal internal vascularity.     Nodules: There is a stable chronic rim-calcified shadowing nodule within the inferior left thyroid lobe which measures 5 x 5 x 3 mm, and is stable and unchanged dating back to the study of 10/16/2013, and is benign, and requires no further follow-up. No new discrete thyroid nodule or mass is evident.     Cervical lymphadenopathy: No abnormal lymph nodes in the imaged portions of the neck.     IMPRESSION:  Unremarkable thyroid ultrasound. Stable small 5 mm rim-calcified nodule within the left thyroid lobe, unchanged from 10/16/2013, and benign given its appearance and long-term stability.   No new suspicious thyroid nodule or mass is evident.     Past Medical History:   Diagnosis Date    Abnormal Pap smear of cervix     Colposcopy normal; pt states believes was med error-wrong patient    Allergic rhinitis     Anemia     during childhood    Dizziness     Headache     Hearing loss     Hypertension     PIH with 1st pregnancy; no pre-eclampsia-labs all WNL    Hypothyroidism     Polycystic disease, ovaries     TIA (transient ischemic attack) 02/16/2018    Tinnitus     Bilat tinnitus     Family History   Problem Relation Age of Onset    Thyroid Cancer Paternal Grandfather         Metastatic    Thyroid Cancer Maternal Grandfather     Cancer Maternal Grandfather         skin,NMSC    Heart Attack Maternal Grandfather         smoker    High Blood Pressure Maternal Grandfather     High Blood Pressure Maternal Aunt     Miscarriages / Stillbirths Maternal Aunt     Miscarriages / Stillbirths Paternal Aunt     Heart Attack Maternal Grandmother         smoker    High Blood Pressure Maternal Grandmother     Stroke Maternal Grandmother     Asthma Paternal Grandmother         smoker    Osteoporosis Paternal Grandmother     Hearing Loss Paternal Uncle      Current Outpatient Medications   Medication Sig Dispense Refill    levothyroxine (SYNTHROID) 88 MCG tablet Take 1 tablet by mouth Daily 90 tablet 2    Lactobacillus Rhamnosus, GG, (RA PROBIOTIC DIGESTIVE CARE) CAPS Take 1 capsule by mouth daily      ibuprofen (ADVIL;MOTRIN) 600 MG tablet Take 1 tablet by mouth every 8 hours as needed for Pain 30 tablet 0    Multiple Vitamins-Minerals (MULTIVITAL PO) Take by mouth      Probiotic Product (PROBIOTIC-10) CAPS Take by mouth daily      Aspirin-Acetaminophen-Caffeine (EXCEDRIN MIGRAINE PO) Take by mouth (Patient not taking: Reported on 1/6/2022) No current facility-administered medications for this visit. Review of Systems   Constitutional: Negative for activity change, appetite change, diaphoresis, fatigue and unexpected weight change. Eyes: Negative for pain and visual disturbance. Respiratory: Negative for shortness of breath. Cardiovascular: Negative for palpitations and leg swelling. Gastrointestinal: Negative for abdominal pain, constipation and diarrhea. Endocrine: Negative for cold intolerance, heat intolerance, polydipsia, polyphagia and polyuria. Musculoskeletal: Negative for back pain, gait problem, myalgias and neck pain. Skin: Negative for color change and pallor. Neurological: Negative for dizziness, weakness, light-headedness and headaches. Hematological: Does not bruise/bleed easily. Psychiatric/Behavioral: Negative for sleep disturbance. The patient is not nervous/anxious and is not hyperactive. There were no vitals filed for this visit. televisit    Physical Exam televisit    Assessment  Rey Clemens is a 33 y.o. female with Hypothyroidism, MNG, Hashimoto disease    Plan  Problem List Items Addressed This Visit     Hashimoto's disease      Repeat labs in 6 mos or earlier if new symptoms present  Discussed co relation of medication dose to weight fluctuations  Reports being asymptomatic @ 88 mcg and to continue         Relevant Medications    levothyroxine (SYNTHROID) 88 MCG tablet    Thyroid nodule     Reviewed thyroid US  No further follow up for nodule at this time as stable since 2013  May repeat if new symptoms noted         Relevant Medications    levothyroxine (SYNTHROID) 88 MCG tablet      Other Visit Diagnoses     Hypothyroidism due to Hashimoto's thyroiditis    -  Primary    Relevant Medications    levothyroxine (SYNTHROID) 88 MCG tablet    Other Relevant Orders    T4, Free    TSH without Reflex        Return in about 6 months (around 7/6/2022).

## 2022-02-23 ENCOUNTER — OFFICE VISIT (OUTPATIENT)
Dept: ENT CLINIC | Age: 37
End: 2022-02-23
Payer: COMMERCIAL

## 2022-02-23 VITALS
BODY MASS INDEX: 24.55 KG/M2 | HEIGHT: 68 IN | DIASTOLIC BLOOD PRESSURE: 81 MMHG | TEMPERATURE: 97.9 F | WEIGHT: 162 LBS | SYSTOLIC BLOOD PRESSURE: 128 MMHG

## 2022-02-23 DIAGNOSIS — H69.82 DYSFUNCTION OF LEFT EUSTACHIAN TUBE: Primary | ICD-10-CM

## 2022-02-23 PROCEDURE — 99213 OFFICE O/P EST LOW 20 MIN: CPT | Performed by: STUDENT IN AN ORGANIZED HEALTH CARE EDUCATION/TRAINING PROGRAM

## 2022-02-23 RX ORDER — METHYLPREDNISOLONE 4 MG/1
TABLET ORAL
Qty: 1 KIT | Refills: 0 | Status: SHIPPED | OUTPATIENT
Start: 2022-02-23 | End: 2022-03-01

## 2022-02-23 ASSESSMENT — ENCOUNTER SYMPTOMS
RHINORRHEA: 0
SHORTNESS OF BREATH: 0
EYE PAIN: 0
COUGH: 0
VOMITING: 0
NAUSEA: 0

## 2022-02-23 NOTE — PROGRESS NOTES
[facility]      Patient Name: [address] [place] Record Number:  [id number]  Primary Care Physician: Emerald Woods MD  Date of Consultation: 2022    Chief Complaint:   Chief Complaint   Patient presents with    Other     Patient states she was sick a few weeks ago. She states her left ear is now painful        HISTORY OF PRESENT ILLNESS  Zen Becerra is a(n) 29 y.o. female who presents with left-sided otalgia. She was seen approximately 3 months ago for otalgia. She had an audiogram at that time which did not show any abnormalities. She continues to have pain on that left side. It is sharp. She denies any changes in hearing. She does have some vertiginous episodes associated with this pain. She is able to clear her ears occasionally but does not feel that the left side fully clears. She does have occasional bouts of vertigo that have started at the same time as her eustachian tube dysfunction. Interval History 2022  Zen Becerra recently had an upper respiratory infection and has had fullness in her left ear since that point in time.   She has some pain and discomfort in the left ear    Patient Active Problem List   Diagnosis    Thyroid nodule    Hashimoto's disease    Gestational hypertension    Postinfectious hypothyroidism    Thrombocytopenia affecting pregnancy (HCC)    Benign gestational thrombocytopenia in third trimester ([address]     (normal spontaneous vaginal delivery)    Amaurosis fugax    Migraine with aura and without status migrainosus, not intractable    Hypercoagulable state ([address]    Dysfunction of left eustachian tube     Past Surgical History:   Procedure Laterality Date    SHOULDER SURGERY      left shoulder    WISDOM TOOTH EXTRACTION       Family History   Problem Relation Age of Onset    Thyroid Cancer Paternal Grandfather         Metastatic    Thyroid Cancer Maternal Grandfather     Cancer Maternal Grandfather         skin,NMSC    Heart Attack Maternal Grandfather         smoker    High Blood Pressure Maternal Grandfather     High Blood Pressure Maternal Aunt     Miscarriages / Stillbirths Maternal Aunt     Miscarriages / Stillbirths Paternal Aunt     Heart Attack Maternal Grandmother         smoker    High Blood Pressure Maternal Grandmother     Stroke Maternal Grandmother     Asthma Paternal Grandmother         smoker    Osteoporosis Paternal Grandmother     Hearing Loss Paternal Uncle      Social History     Socioeconomic History    Marital status:      Spouse name: Not on file    Number of children: Not on file    Years of education: Not on file    Highest education level: Not on file   Occupational History    Not on file   Tobacco Use    Smoking status: Never Smoker    Smokeless tobacco: Never Used   Vaping Use    Vaping Use: Never used   Substance and Sexual Activity    Alcohol use: No    Drug use: No    Sexual activity: Yes     Partners: Male   Other Topics Concern    Not on file   Social History Narrative    Not on file     Social Determinants of Health     Financial Resource Strain:     Difficulty of Paying Living Expenses: Not on file   Food Insecurity:     Worried About Running Out of Food in the Last Year: Not on file    [place] of Food in the Last Year: Not on file   Transportation Needs:     Lack of Transportation (Medical): Not on file    Lack of Transportation (Non-Medical):  Not on file   Physical Activity:     Days of Exercise per Week: Not on file    Minutes of Exercise per Session: Not on file   Stress:     Feeling of Stress : Not on file   Social Connections:     Frequency of Communication with Friends and Family: Not on file    Frequency of Social Gatherings with Friends and Family: Not on file    Attends Mu-ism Services: Not on file    Active Member of Clubs or Organizations: Not on file    Attends Club or Organization Meetings: Not on file  Marital Status: Not on file   Intimate Partner Violence:     Fear of Current or Ex-Partner: Not on file    Emotionally Abused: Not on file    Physically Abused: Not on file    Sexually Abused: Not on file   Housing Stability:     Unable to Pay for Housing in the Last Year: Not on file    Number of [place] in the Last Year: Not on file    Unstable Housing in the Last Year: Not on file       DRUG/FOOD ALLERGIES: Sulfa antibiotics and Zofran [ondansetron]    CURRENT MEDICATIONS  Prior to Admission medications    Medication Sig Start Date End Date Taking? Authorizing Provider   levothyroxine (SYNTHROID) 88 MCG tablet Take 1 tablet by mouth Daily 1/6/22   Alexis Barnett, APRN - CNP   Lactobacillus Rhamnosus, GG, (RA PROBIOTIC DIGESTIVE CARE) CAPS Take 1 capsule by mouth daily    Historical Provider, MD   Aspirin-Acetaminophen-Caffeine (EXCEDRIN MIGRAINE PO) Take by mouth  Patient not taking: Reported on 1/6/2022    Historical Provider, MD   ibuprofen (ADVIL;MOTRIN) 600 MG tablet Take 1 tablet by mouth every 8 hours as needed for Pain 4/15/19   Baldo Braxton MD   Multiple Vitamins-Minerals (MULTIVITAL PO) Take by mouth    Historical Provider, MD   Probiotic Product (PROBIOTIC-10) CAPS Take by mouth daily    Historical Provider, MD       REVIEW OF SYSTEMS  The following systems were reviewed and revealed the following in addition to any already discussed in the HPI:    Review of Systems   Constitutional: Negative for fatigue and fever. HENT: Positive for ear pain. Negative for congestion, ear discharge, hearing loss, postnasal drip, rhinorrhea and sneezing. Eyes: Negative for pain and visual disturbance. Respiratory: Negative for cough and shortness of breath. Cardiovascular: Negative for chest pain. Gastrointestinal: Negative for nausea and vomiting. Endocrine: Negative. Genitourinary: Negative. Musculoskeletal: Negative for neck pain and neck stiffness. Skin: Negative for rash. Neurological: Negative for dizziness and headaches. PHYSICAL EXAM  /81 (Site: Right Upper Arm, Position: Sitting)   Temp 97.9 °F (36.6 °C)   Ht 5' 7.5\" (1.715 m)   Wt 162 lb (73.5 kg)   BMI 25.00 kg/m²     GENERAL: No Acute Distress, Alert and Oriented, no hoarseness  EYES: EOMI, Anti-icteric  NOSE: No epistaxis, nasal mucosa within normal limits, no purulent drainage  EARS: Normal external canal appearance, EAC patent bilaterally, TM intact on the right with no evidence of effusions, left TM with ballooning of the TM without any evidence of effusion  FACE: 1/6 House-Brackmann Scale, symmetric, sensation equal bilaterally  ORAL CAVITY: No masses or lesions palpated, uvula is midline, moist mucous membranes,   NECK: Normal range of motion, no thyromegaly, trachea is midline, no lymphadenopathy, no neck masses, no crepitus  CHEST: Normal respiratory effort, no retractions, breathing comfortably  SKIN: No rashes, normal appearing skin, no evidence of skin lesions/tumors    RADIOLOGY  Summary of findings:      PROCEDURE      ASSESSMENT/PLAN  Libia Kinney is a very pleasant 29 y.o. female with    1. Dysfunction of left eustachian tube  I will prescribe her Medrol Dosepak for his eustachian dysfunction. I also advised her to do the Valsalva and try and clear her ears as many times a day as possible. She will try this and call me with the results. - methylPREDNISolone (MEDROL DOSEPACK) 4 MG tablet; Take by mouth. Dispense: 1 kit; Refill: 0    We again discussed doing eustachian tube dilation versus an in office tube versus a myringotomy to relieve the pressure. She would like to proceed with a Medrol Dosepak prior to doing any invasive procedures. She will call with progress check and we will make follow-up ointment after that call. Medical Decision Making:   The following items were considered in medical decision making:  Independent review of images  Review / order clinical lab tests  Review / order radiology tests  Decision to obtain old records

## 2022-02-25 ENCOUNTER — TELEPHONE (OUTPATIENT)
Dept: ENT CLINIC | Age: 37
End: 2022-02-25

## 2022-02-25 NOTE — TELEPHONE ENCOUNTER
Patient called in stating she was seen on 02/23/2022 and thought Dr. Inder Gonzalez was sending in a medrol dosepack and drops for her ear but the pharmacy only had the medrol dose pack. I read the assessment and plan to the patient from Dr. Inder Gonzalez note:    \"I will prescribe her Medrol Dosepak for his eustachian dysfunction. I also advised her to do the Valsalva and try and clear her ears as many times a day as possible. She will try this and call me with the results. \"    Patient expressed understanding.

## 2022-02-27 NOTE — PROGRESS NOTES
Ayana Lou (:  1985) is a Established patient, here for evaluation of the following: HYpothyroidism,  History of thrombocytopenia,    Assessment & Plan   Below is the assessment and plan developed based on review of pertinent history, physical exam, labs, studies, and medications. 1. Borderline hyperlipidemia    - Lipid Panel; Future    2. History of thrombocytopenia  -platelet count    3. Screening for deficiency anemia    - CBC; Future    4. Screening for diabetes mellitus    - Comprehensive Metabolic Panel; Future    5. Hashimoto's disease    -on levothyroxine      Follow up in 6 months      Current Outpatient Medications   Medication Sig Dispense Refill    methylPREDNISolone (MEDROL DOSEPACK) 4 MG tablet Take by mouth. 1 kit 0    levothyroxine (SYNTHROID) 88 MCG tablet Take 1 tablet by mouth Daily 90 tablet 2    Lactobacillus Rhamnosus, GG, (RA PROBIOTIC DIGESTIVE CARE) CAPS Take 1 capsule by mouth daily      Aspirin-Acetaminophen-Caffeine (EXCEDRIN MIGRAINE PO) Take by mouth (Patient not taking: Reported on 2022)      ibuprofen (ADVIL;MOTRIN) 600 MG tablet Take 1 tablet by mouth every 8 hours as needed for Pain 30 tablet 0    Multiple Vitamins-Minerals (MULTIVITAL PO) Take by mouth      Probiotic Product (PROBIOTIC-10) CAPS Take by mouth daily       No current facility-administered medications for this visit. Subjective   HPI   CC- HYpothroidism-  Due to Hashimoto's thyroiditis- on levothyroxine 88 mcg daily. Taking it on an empty stomach. No side effects. Lab Results   Component Value Date    TSH 3.32 2021     Borderline hyperlipidemia- controlled with diet. Been losing weight.    Lab Results   Component Value Date    CHOL 158 2020    CHOL 155 2019    CHOL 137 2018     Lab Results   Component Value Date    TRIG 25 2020    TRIG 30 2019    TRIG 40 2018     Lab Results   Component Value Date    HDL 53 2020    HDL 47 2019 HDL 49 02/17/2018     Lab Results   Component Value Date    LDLCALC see below 07/24/2020    LDLCALC 102 (H) 05/31/2019    [facility] 80 02/17/2018     History of thrombocytopenia and screening for anemia. Lab Results   Component Value Date    WBC 5.7 07/24/2020    HGB 15.1 07/24/2020    HCT 44.3 07/24/2020    MCV 87.9 07/24/2020     07/24/2020        PLT                                                                123         Review of Systems   Constitutional: Negative for chills, fatigue and fever. HENT: Negative for congestion. Eyes: Negative for visual disturbance. Respiratory: Negative for cough and shortness of breath. Cardiovascular: Negative for chest pain and leg swelling. Gastrointestinal: Negative for diarrhea, nausea and vomiting. Endocrine: Negative for cold intolerance. Genitourinary: Negative for difficulty urinating. Musculoskeletal: Negative for arthralgias. Neurological: Negative for dizziness and headaches. Hematological: Negative for adenopathy. Does not bruise/bleed easily. Psychiatric/Behavioral: Negative.            Objective   Patient-Reported Vitals  No data recorded     Physical Exam  [INSTRUCTIONS:  \"[x]\" Indicates a positive item  \"[]\" Indicates a negative item  -- DELETE ALL ITEMS NOT EXAMINED]    Constitutional: [x] Appears well-developed and well-nourished [x] No apparent distress      [] Abnormal -     Mental status: [x] Alert and awake  [x] Oriented to person/place/time [x] Able to follow commands    [] Abnormal -     Eyes:   EOM    [x]  Normal    [] Abnormal -   Sclera  [x]  Normal    [] Abnormal -          Discharge [x]  None visible   [] Abnormal -     HENT: [x] Normocephalic, atraumatic  [] Abnormal -   [] Mouth/Throat: Mucous membranes are moist    External Ears [x] Normal  [] Abnormal -    Neck: [x] No visualized mass [] Abnormal -     Pulmonary/Chest: [x] Respiratory effort normal   [x] No visualized signs of difficulty breathing or respiratory distress        [] Abnormal -      Musculoskeletal:   [x] Normal gait with no signs of ataxia         [x] Normal range of motion of neck        [] Abnormal -     Neurological:        [x] No Facial Asymmetry (Cranial nerve 7 motor function) (limited exam due to video visit)          [x] No gaze palsy        [] Abnormal -          Skin:        [x] No significant exanthematous lesions or discoloration noted on facial skin         [] Abnormal -            Psychiatric:       [x] Normal Affect [] Abnormal -        [x] No Hallucinations    Other pertinent observable physical exam findings:-    Instruction:  Get blood work done  Continue levothyroxine      Reviewed previous notes, tests results and face to face with the patient discussing the diagnosis and importance  of compliance with the treatments as well as documenting on the day of visit. Answered questions and encouraged to call  for any other concerns. Dov Atwood, was evaluated through a synchronous (real-time) audio-video encounter. The patient (or guardian if applicable) is aware that this is a billable service, which includes applicable co-pays. This Virtual Visit was conducted with patient's (and/or legal guardian's) consent. The visit was conducted pursuant to the emergency declaration under the [address] [place] [address] waiver authority and the [employer] Act. Patient identification was verified, and a caregiver was present when appropriate. The patient was located at home in a state where the provider was licensed to provide care.        --Jazzy Kwok MD

## 2022-02-28 ENCOUNTER — TELEMEDICINE (OUTPATIENT)
Dept: FAMILY MEDICINE CLINIC | Age: 37
End: 2022-02-28
Payer: COMMERCIAL

## 2022-02-28 DIAGNOSIS — E06.3 HASHIMOTO'S DISEASE: Primary | ICD-10-CM

## 2022-02-28 DIAGNOSIS — Z13.1 SCREENING FOR DIABETES MELLITUS: ICD-10-CM

## 2022-02-28 DIAGNOSIS — Z13.0 SCREENING FOR DEFICIENCY ANEMIA: ICD-10-CM

## 2022-02-28 DIAGNOSIS — Z86.2 HISTORY OF THROMBOCYTOPENIA: ICD-10-CM

## 2022-02-28 DIAGNOSIS — E78.5 BORDERLINE HYPERLIPIDEMIA: ICD-10-CM

## 2022-02-28 PROCEDURE — 99214 OFFICE O/P EST MOD 30 MIN: CPT | Performed by: INTERNAL MEDICINE

## 2022-03-01 ASSESSMENT — ENCOUNTER SYMPTOMS
NAUSEA: 0
SHORTNESS OF BREATH: 0
DIARRHEA: 0
COUGH: 0
VOMITING: 0

## 2022-05-03 DIAGNOSIS — E06.3 HYPOTHYROIDISM DUE TO HASHIMOTO'S THYROIDITIS: ICD-10-CM

## 2022-05-03 DIAGNOSIS — E03.8 HYPOTHYROIDISM DUE TO HASHIMOTO'S THYROIDITIS: ICD-10-CM

## 2022-05-03 RX ORDER — LEVOTHYROXINE SODIUM 88 UG/1
88 TABLET ORAL DAILY
Qty: 90 TABLET | Refills: 2 | Status: SHIPPED | OUTPATIENT
Start: 2022-05-03

## 2022-05-03 NOTE — TELEPHONE ENCOUNTER
[facility] request for levothyroxine 88 mcg refill. Patient's pharmacy is [facility]. No future appointments. Past appointment was 2/28/22.

## 2022-07-12 ENCOUNTER — NURSE TRIAGE (OUTPATIENT)
Dept: OTHER | Facility: CLINIC | Age: 37
End: 2022-07-12

## 2022-07-12 ENCOUNTER — TELEMEDICINE (OUTPATIENT)
Dept: FAMILY MEDICINE CLINIC | Age: 37
End: 2022-07-12
Payer: COMMERCIAL

## 2022-07-12 DIAGNOSIS — H69.82 DYSFUNCTION OF LEFT EUSTACHIAN TUBE: Primary | ICD-10-CM

## 2022-07-12 PROCEDURE — 99213 OFFICE O/P EST LOW 20 MIN: CPT | Performed by: PHYSICIAN ASSISTANT

## 2022-07-12 RX ORDER — METHYLPREDNISOLONE 4 MG/1
TABLET ORAL
Qty: 1 KIT | Refills: 0 | Status: SHIPPED | OUTPATIENT
Start: 2022-07-12 | End: 2022-07-18

## 2022-07-12 ASSESSMENT — ENCOUNTER SYMPTOMS: RESPIRATORY NEGATIVE: 1

## 2022-07-12 NOTE — PROGRESS NOTES
Pulmonary/Chest: [x] Respiratory effort normal.  [x] No visualized signs of difficulty breathing or respiratory distress        [] Abnormal-     ASSESSMENT/PLAN:     Diagnosis Orders   1. Dysfunction of left eustachian tube       Will start medrol dose pack, if not resolving to follow up with Dr. Gus Meyers. Keesha Worthington, was evaluated through a synchronous (real-time) audio-video encounter. The patient (or guardian if applicable) is aware that this is a billable service, which includes applicable co-pays. This Virtual Visit was conducted with patient's (and/or legal guardian's) consent. The visit was conducted pursuant to the emergency declaration under the [address] [place] [address], [address] [place] [address] authority and the [employer] and [employer] General Act. Patient identification was verified, and a caregiver was present when appropriate. The patient was located at Home: [address] [place] [address]. Provider was located at [place] [address]): [address] [place], [place] [address] [address] 19. Total time spent on this encounter: 21    --GARO Eduardo on 7/12/2022 at 11:45 AM    An electronic signature was used to authenticate this note.

## 2022-07-12 NOTE — TELEPHONE ENCOUNTER
Received call from Aureliano Bullock at [facility] with [employer]. Subjective: Caller states \"Has had a cold for about a week and a half, now having drainage into ears causing some vertigo. \"     Current Symptoms: ear congestion, sinus congestion, vertigo (yesterday, mild today), n/v/d, denies ear ache    Onset: 1 week ago; gradual, worsening    Associated Symptoms: reduced activity    Pain Severity: 0/10; N/A; none    Temperature: Denies      What has been tried: dramamine, zyrtec    LMP: NA Pregnant: NA    Recommended disposition: See in Office Today    Care advice provided, patient verbalizes understanding; denies any other questions or concerns; instructed to call back for any new or worsening symptoms. Patient/Caller agrees with recommended disposition; writer provided warm transfer to Peewee Weinstein at [facility] for appointment scheduling     Attention Provider: Thank you for allowing me to participate in the care of your patient. The patient was connected to triage in response to information provided to the ECC/PSC. Please do not respond through this encounter as the response is not directed to a shared pool.     Reason for Disposition   Patient wants to be seen    Protocols used: EAR - CONGESTION-ADULT-OH

## 2022-07-18 ENCOUNTER — PROCEDURE VISIT (OUTPATIENT)
Dept: AUDIOLOGY | Age: 37
End: 2022-07-18

## 2022-07-18 DIAGNOSIS — Z01.10 ENCOUNTER FOR EXAM OF EARS AND HEARING W/O ABNORMAL FINDINGS: Primary | ICD-10-CM

## 2022-07-18 PROCEDURE — V5275 EAR IMPRESSION: HCPCS | Performed by: AUDIOLOGIST

## 2022-07-18 NOTE — PROGRESS NOTES
Katerin Carlton   1985, 40 y.o. female   [id number]     EARMOLD IMPRESSIONS      Katerin Carlton was seen today, 7/18/2022, for earmold impressions to order custom swimmolds. Pre- and post- impression otoscopy was unremarkable and revealed clear ear canals bilaterally. Date: 7/18/2022     PROCEDURES:      Explained to patient $30.00 impression fee due to day and $75.00 per ear mold fee due at time of fitting. Patient reported understanding and decided to continue with ear mold impression today. Pre-impression otoscopy revealed clear canals, AU. Impressions taken without incident, bilaterally. Post-impression otoscopy clear, AU. RECOMMENDATIONS:     Return for Ear mold fitting as scheduled. Patient paid total cost of $60.00 ($30.00 per ear impression fee).      Unbundled Billing- see associated fees and codes below:     [id number]= $60.00 (ear mold impression/ $30.00 each)     Azalea Rodriguez  Audiologist

## 2022-08-11 ENCOUNTER — PROCEDURE VISIT (OUTPATIENT)
Dept: AUDIOLOGY | Age: 37
End: 2022-08-11

## 2022-08-11 DIAGNOSIS — Z01.10 ENCOUNTER FOR EXAM OF EARS AND HEARING W/O ABNORMAL FINDINGS: Primary | ICD-10-CM

## 2022-08-11 PROCEDURE — V5265 EAR MOLD/INSERT, DISP: HCPCS | Performed by: AUDIOLOGIST

## 2022-08-11 NOTE — PROGRESS NOTES
Gosia Nation   1985, 40 y.o. female   [id number]     EARMOLD FITTING      Gosia Nation was seen today, 8/11/2022, to be fit with her custom swim molds. Date: 8/11/2022     PROCEDURES:      Visual inspection of swim molds revealed overall good fit, bilaterally. Explained to patient about the 90 day remake warranty. RECOMMENDATIONS:     Return as needed    Patient paid total cost of $150.00 ($75.00 per earmold).      Unbundled Billing- see associated fees and codes below:     [id number]= $150.00 (ear mold/ $75.00 each)     Azalea Hall  Audiologist

## 2022-08-22 ENCOUNTER — TELEPHONE (OUTPATIENT)
Dept: ENT CLINIC | Age: 37
End: 2022-08-22

## 2022-08-22 NOTE — TELEPHONE ENCOUNTER
Patient calling because one of her earmold pieces feels damaged.   Would like to discuss warranty options     Patient phone number [phone]  Patient last office visit 8/11/22

## 2022-09-07 ENCOUNTER — APPOINTMENT (OUTPATIENT)
Dept: GENERAL RADIOLOGY | Age: 37
End: 2022-09-07
Payer: COMMERCIAL

## 2022-09-07 ENCOUNTER — HOSPITAL ENCOUNTER (EMERGENCY)
Age: 37
Discharge: HOME | End: 2022-09-07
Attending: EMERGENCY MEDICINE
Payer: COMMERCIAL

## 2022-09-07 VITALS
HEART RATE: 53 BPM | HEIGHT: 67 IN | DIASTOLIC BLOOD PRESSURE: 63 MMHG | OXYGEN SATURATION: 97 % | WEIGHT: 166 LBS | BODY MASS INDEX: 26.06 KG/M2 | TEMPERATURE: 98.3 F | SYSTOLIC BLOOD PRESSURE: 104 MMHG | RESPIRATION RATE: 13 BRPM

## 2022-09-07 DIAGNOSIS — R00.1 BRADYCARDIA: ICD-10-CM

## 2022-09-07 DIAGNOSIS — R11.10 VOMITING AND DIARRHEA: ICD-10-CM

## 2022-09-07 DIAGNOSIS — E86.0 DEHYDRATION, MILD: ICD-10-CM

## 2022-09-07 DIAGNOSIS — R19.7 VOMITING AND DIARRHEA: ICD-10-CM

## 2022-09-07 DIAGNOSIS — R07.9 CHEST PAIN, UNSPECIFIED TYPE: Primary | ICD-10-CM

## 2022-09-07 LAB
A/G RATIO: 1.6 (ref 1.1–2.2)
ALBUMIN SERPL-MCNC: 4.6 G/DL (ref 3.4–5)
ALP BLD-CCNC: 68 U/L (ref 40–129)
ALT SERPL-CCNC: 11 U/L (ref 10–40)
ANION GAP SERPL CALCULATED.3IONS-SCNC: 13 MMOL/L (ref 3–16)
AST SERPL-CCNC: 16 U/L (ref 15–37)
BASOPHILS ABSOLUTE: 0 K/UL (ref 0–0.2)
BASOPHILS RELATIVE PERCENT: 0.3 %
BILIRUB SERPL-MCNC: 0.7 MG/DL (ref 0–1)
BUN BLDV-MCNC: 13 MG/DL (ref 7–20)
CALCIUM SERPL-MCNC: 9.1 MG/DL (ref 8.3–10.6)
CHLORIDE BLD-SCNC: 97 MMOL/L (ref 99–110)
CO2: 24 MMOL/L (ref 21–32)
CREAT SERPL-MCNC: 0.7 MG/DL (ref 0.6–1.1)
EKG ATRIAL RATE: 67 BPM
EKG DIAGNOSIS: NORMAL
EKG P AXIS: 29 DEGREES
EKG P-R INTERVAL: 150 MS
EKG Q-T INTERVAL: 420 MS
EKG QRS DURATION: 80 MS
EKG QTC CALCULATION (BAZETT): 443 MS
EKG R AXIS: 15 DEGREES
EKG T AXIS: 38 DEGREES
EKG VENTRICULAR RATE: 67 BPM
EOSINOPHILS ABSOLUTE: 0 K/UL (ref 0–0.6)
EOSINOPHILS RELATIVE PERCENT: 0.3 %
GFR AFRICAN AMERICAN: >60
GFR NON-AFRICAN AMERICAN: >60
GLUCOSE BLD-MCNC: 129 MG/DL (ref 70–99)
HCG QUALITATIVE: NEGATIVE
HCT VFR BLD CALC: 42.9 % (ref 36–48)
HEMOGLOBIN: 14.7 G/DL (ref 12–16)
LYMPHOCYTES ABSOLUTE: 1.1 K/UL (ref 1–5.1)
LYMPHOCYTES RELATIVE PERCENT: 9.1 %
MCH RBC QN AUTO: 29.4 PG (ref 26–34)
MCHC RBC AUTO-ENTMCNC: 34.2 G/DL (ref 31–36)
MCV RBC AUTO: 86.1 FL (ref 80–100)
MONOCYTES ABSOLUTE: 0.4 K/UL (ref 0–1.3)
MONOCYTES RELATIVE PERCENT: 3.7 %
NEUTROPHILS ABSOLUTE: 10.1 K/UL (ref 1.7–7.7)
NEUTROPHILS RELATIVE PERCENT: 86.6 %
PDW BLD-RTO: 13.5 % (ref 12.4–15.4)
PLATELET # BLD: 161 K/UL (ref 135–450)
PMV BLD AUTO: 9.3 FL (ref 5–10.5)
POTASSIUM REFLEX MAGNESIUM: 3.6 MMOL/L (ref 3.5–5.1)
RBC # BLD: 4.98 M/UL (ref 4–5.2)
SARS-COV-2, NAAT: NOT DETECTED
SODIUM BLD-SCNC: 134 MMOL/L (ref 136–145)
TOTAL PROTEIN: 7.4 G/DL (ref 6.4–8.2)
TROPONIN: <0.01 NG/ML
TSH SERPL DL<=0.05 MIU/L-ACNC: 3.87 UIU/ML (ref 0.27–4.2)
WBC # BLD: 11.7 K/UL (ref 4–11)

## 2022-09-07 PROCEDURE — 99284 EMERGENCY DEPT VISIT MOD MDM: CPT

## 2022-09-07 PROCEDURE — 71046 X-RAY EXAM CHEST 2 VIEWS: CPT

## 2022-09-07 PROCEDURE — 85025 COMPLETE CBC W/AUTO DIFF WBC: CPT

## 2022-09-07 PROCEDURE — 93005 ELECTROCARDIOGRAM TRACING: CPT

## 2022-09-07 PROCEDURE — 84703 CHORIONIC GONADOTROPIN ASSAY: CPT

## 2022-09-07 PROCEDURE — 84443 ASSAY THYROID STIM HORMONE: CPT

## 2022-09-07 PROCEDURE — 96360 HYDRATION IV INFUSION INIT: CPT

## 2022-09-07 PROCEDURE — 2580000003 HC RX 258: Performed by: EMERGENCY MEDICINE

## 2022-09-07 PROCEDURE — 80053 COMPREHEN METABOLIC PANEL: CPT

## 2022-09-07 PROCEDURE — 87635 SARS-COV-2 COVID-19 AMP PRB: CPT

## 2022-09-07 PROCEDURE — 36415 COLL VENOUS BLD VENIPUNCTURE: CPT

## 2022-09-07 PROCEDURE — 96361 HYDRATE IV INFUSION ADD-ON: CPT

## 2022-09-07 PROCEDURE — 84484 ASSAY OF TROPONIN QUANT: CPT

## 2022-09-07 RX ORDER — 0.9 % SODIUM CHLORIDE 0.9 %
1000 INTRAVENOUS SOLUTION INTRAVENOUS ONCE
Status: COMPLETED | OUTPATIENT
Start: 2022-09-07 | End: 2022-09-07

## 2022-09-07 RX ORDER — METOCLOPRAMIDE HYDROCHLORIDE 5 MG/ML
10 INJECTION INTRAMUSCULAR; INTRAVENOUS ONCE
Status: DISCONTINUED | OUTPATIENT
Start: 2022-09-07 | End: 2022-09-07 | Stop reason: HOSPADM

## 2022-09-07 RX ADMIN — SODIUM CHLORIDE 1000 ML: 9 INJECTION, SOLUTION INTRAVENOUS at 00:41

## 2022-09-07 ASSESSMENT — ENCOUNTER SYMPTOMS
COUGH: 0
ABDOMINAL PAIN: 0
DIARRHEA: 1
RHINORRHEA: 0
VOMITING: 1
PHOTOPHOBIA: 0
BACK PAIN: 0
CHEST TIGHTNESS: 0
SHORTNESS OF BREATH: 0
NAUSEA: 1

## 2022-09-07 ASSESSMENT — PAIN - FUNCTIONAL ASSESSMENT: PAIN_FUNCTIONAL_ASSESSMENT: 0-10

## 2022-09-07 ASSESSMENT — PAIN SCALES - GENERAL: PAINLEVEL_OUTOF10: 7

## 2022-09-07 ASSESSMENT — PAIN DESCRIPTION - LOCATION: LOCATION: CHEST

## 2022-09-07 ASSESSMENT — PAIN DESCRIPTION - ORIENTATION: ORIENTATION: LEFT

## 2022-09-07 ASSESSMENT — PAIN DESCRIPTION - DESCRIPTORS: DESCRIPTORS: SHARP

## 2022-09-07 ASSESSMENT — PAIN DESCRIPTION - PAIN TYPE: TYPE: ACUTE PAIN

## 2022-09-07 ASSESSMENT — PAIN DESCRIPTION - FREQUENCY: FREQUENCY: CONTINUOUS

## 2022-09-07 NOTE — ED PROVIDER NOTES
Emergency Department Provider Note  Location: [place] [address] ED  9/7/2022     Patient Identification  Kaley Childs is a 40 y.o. female    Chief Complaint  Chest Pain (Pt states she had coffee today and has had nausea and diarrhea since 9pm; took Reglan at home  and has been having palpitations and intermittent Chest pain since )      HPI    (History provided by patient)    Patient is a 40 y.o. female with a significant PMHx of anemia, dizziness, headaches, hypertension, hypothyroidism, PCOS, and previous TIA after pregnancy, that presents the emergency department today with concerns of chest pain and palpitations, both starting earlier today, alongside some nausea, 1 episode of vomiting, 1 episode of diarrhea, and feelings of dehydration. Patient says she is needed IV fluids in the past, presents emergency department mostly looking for nausea control and IV fluids. Patient did attempt to take some Reglan and she was nauseated, however she vomited almost immediately after. Patient says additionally, she drank some coffee on an empty stomach earlier today, which she believes could be related, but she also states \"I feel pretty horrible\" make sure nothing else is going on. She describes her chest pain as a firm poke to her left lateral chest, very intermittent, and not constant. Not worse with exertion. The palpitations, she describes as sometimes feeling like her heart is pounding, but does not know she has had a fast heart rate, has not monitor her pulse. She says she has had similar etiology in the past, but has never felt this bad, prompting her to come to the ER. Patient is tearful today in the ER, stating she is very scared. No other concerns today in the emergency department including headache, vision changes, numbness, weakness, tingling.   She does not believe her symptoms are related to her thyroid disease; in her chart it has been documented that patient is sensitive to levothyroxine with symptomatology side effects. 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     I have reviewed the following nursing documentation:  Allergies: Allergies   Allergen Reactions    Sulfa Antibiotics      Passed out    Zofran [Ondansetron]      Does not like how it makes her feel       Past medical history:  has a past medical history of Abnormal Pap smear of cervix, Allergic rhinitis, Anemia, Dizziness, Headache, Hearing loss, Hypertension, Hypothyroidism, Polycystic disease, ovaries, TIA (transient ischemic attack) (02/16/2018), and Tinnitus. Past surgical history:  has a past surgical history that includes [place] tooth extraction and shoulder surgery. Home medications:   Prior to Admission medications    Medication Sig Start Date End Date Taking? Authorizing Provider   levothyroxine (SYNTHROID) 88 MCG tablet Take 1 tablet by mouth Daily 5/3/22   Brady Girard MD   Lactobacillus Rhamnosus, GG, ( PROBIOTIC DIGESTIVE CARE) CAPS Take 1 capsule by mouth daily    Historical Provider, MD   Aspirin-Acetaminophen-Caffeine (EXCEDRIN MIGRAINE PO) Take by mouth  Patient not taking: Reported on 1/6/2022    Historical Provider, MD   ibuprofen (ADVIL;MOTRIN) 600 MG tablet Take 1 tablet by mouth every 8 hours as needed for Pain 4/15/19   Anum Benavides MD   Multiple Vitamins-Minerals (MULTIVITAL PO) Take by mouth    Historical Provider, MD   Probiotic Product (PROBIOTIC-10) CAPS Take by mouth daily    Historical Provider, MD       Social history:  reports that she has never smoked. She has never used smokeless tobacco. She reports that she does not drink alcohol and does not use drugs.     Family history:    Family History   Problem Relation Age of Onset    Thyroid Cancer Paternal Grandfather         Metastatic    Thyroid Cancer Maternal Grandfather     Cancer Maternal Grandfather         [address] [facility]    Heart Attack Maternal Grandfather         smoker    High Blood Pressure Maternal Grandfather     High Blood Pressure Maternal Aunt     Miscarriages / Stillbirths Maternal Aunt     Miscarriages / Stillbirths Paternal Aunt     Heart Attack Maternal Grandmother         smoker    High Blood Pressure Maternal Grandmother     Stroke Maternal Grandmother     Asthma Paternal Grandmother         smoker    Osteoporosis Paternal Grandmother     Hearing Loss Paternal Uncle          ROS  Review of Systems   Constitutional:  Positive for appetite change. Negative for activity change, fatigue and fever. HENT:  Negative for congestion and rhinorrhea. Eyes:  Negative for photophobia and visual disturbance. Respiratory:  Negative for cough, chest tightness and shortness of breath. Cardiovascular:  Positive for chest pain. Negative for leg swelling. Gastrointestinal:  Positive for diarrhea, nausea and vomiting. Negative for abdominal pain. Genitourinary:  Negative for dysuria, flank pain and pelvic pain. Musculoskeletal:  Positive for myalgias. Negative for back pain and neck pain. Skin:  Negative for rash and wound. Neurological:  Negative for dizziness, syncope and headaches. Exam  Vitals:    09/07/22 0020 09/07/22 0100 09/07/22 0130 09/07/22 0200   BP: 126/88 111/70 108/69 104/63   Pulse: 71 59 54 53   Resp: 18 14 16 13   Temp: 98.3 °F (36.8 °C)      TempSrc: Oral      SpO2: 98% 96% 98% 97%   Weight: 166 lb (75.3 kg)      Height: 5' 7\" (1.702 m)            Physical Exam  Vitals reviewed. Constitutional:       General: She is not in acute distress. Appearance: Normal appearance. She is ill-appearing. Comments: Appears as if not feeling well, however interactive, conversational, pleasant, and in no acute clinical cardiopulmonary distress. HENT:      Head: Normocephalic and atraumatic. Right Ear: External ear normal.      Left Ear: External ear normal.      Nose: Nose normal. No congestion. Mouth/Throat:      Mouth: Mucous membranes are dry. Pharynx: Oropharynx is clear.       Comments: Slightly dry mucous membranes  Eyes:      General:         Right eye: No discharge. Left eye: No discharge. Conjunctiva/sclera: Conjunctivae normal.   Cardiovascular:      Rate and Rhythm: Normal rate and regular rhythm. Pulmonary:      Effort: Pulmonary effort is normal. No respiratory distress. Breath sounds: Normal breath sounds. Abdominal:      General: Abdomen is flat. There is no distension. Palpations: Abdomen is soft. Tenderness: There is abdominal tenderness (Generalized, mild, no focality). Musculoskeletal:         General: No swelling, tenderness, deformity or signs of injury. Cervical back: Normal range of motion and neck supple. Right lower leg: No edema. Left lower leg: No edema. Skin:     General: Skin is warm and dry. Coloration: Skin is not pale. Findings: No bruising. Neurological:      General: No focal deficit present. Mental Status: She is alert and oriented to person, place, and time. Psychiatric:         Mood and Affect: Mood normal. Affect is tearful. Behavior: Behavior normal.     ED Course    ED Medication Orders (From admission, onward)      Start Ordered     Status Ordering Provider    09/07/22 0045 09/07/22 0033  0.9 % sodium chloride bolus  ONCE         Last MAR action: New Bag - by MICAH DENISE on 09/07/22 at [place] [id number]    09/07/22 0045 09/07/22 0033  metoclopramide (REGLAN) injection 10 mg  ONCE         Last MAR action: Not Given - by MICHA DENISE on 09/07/22 at [place] [id number], ANGEL CHUNG            EKG  EKG obtained today in the emergency department shows normal sinus rhythm. No ST segment elevation, ST segment depression, hyperacute T waves. Ventricular rate 67. Otherwise, intervals normal.  Reassuring EKG. Normal axis. Radiology  XR CHEST (2 VW)    Result Date: 9/7/2022  EXAMINATION: TWO XRAY VIEWS OF THE CHEST 9/7/2022 12:35 am COMPARISON: None.  HISTORY: ORDERING SYSTEM PROVIDED HISTORY: palpitations, chest pain, r/o PNA, cardiomeg TECHNOLOGIST PROVIDED HISTORY: Reason for exam:->palpitations, chest pain, r/o PNA, cardiomeg FINDINGS: The lungs are without acute focal process. There is no effusion or pneumothorax. The cardiomediastinal silhouette is without acute process. The osseous structures are without acute process. No acute process.       Labs  Results for orders placed or performed during the hospital encounter of 09/07/22   COVID-19, Rapid    Specimen: Nasopharyngeal Swab   Result Value Ref Range    SARS-CoV-2, NAAT Not Detected Not Detected   CBC with Auto Differential   Result Value Ref Range    WBC 11.7 (H) 4.0 - 11.0 K/uL    RBC 4.98 4.00 - 5.20 M/uL    Hemoglobin 14.7 12.0 - 16.0 g/dL    Hematocrit 42.9 36.0 - 48.0 %    MCV 86.1 80.0 - 100.0 fL    MCH 29.4 26.0 - 34.0 pg    MCHC 34.2 31.0 - 36.0 g/dL    RDW 13.5 12.4 - 15.4 %    Platelets 568 790 - 407 K/uL    MPV 9.3 5.0 - 10.5 fL    Neutrophils % 86.6 %    Lymphocytes % 9.1 %    Monocytes % 3.7 %    Eosinophils % 0.3 %    Basophils % 0.3 %    Neutrophils Absolute 10.1 (H) 1.7 - 7.7 K/uL    Lymphocytes Absolute 1.1 1.0 - 5.1 K/uL    Monocytes Absolute 0.4 0.0 - 1.3 K/uL    Eosinophils Absolute 0.0 0.0 - 0.6 K/uL    Basophils Absolute 0.0 0.0 - 0.2 K/uL   CMP w/ Reflex to MG   Result Value Ref Range    Sodium 134 (L) 136 - 145 mmol/L    Potassium reflex Magnesium 3.6 3.5 - 5.1 mmol/L    Chloride 97 (L) 99 - 110 mmol/L    CO2 24 21 - 32 mmol/L    Anion Gap 13 3 - 16    Glucose 129 (H) 70 - 99 mg/dL    BUN 13 7 - 20 mg/dL    Creatinine 0.7 0.6 - 1.1 mg/dL    GFR Non-African American >60 >60    GFR African American >60 >60    Calcium 9.1 8.3 - 10.6 mg/dL    Total Protein 7.4 6.4 - 8.2 g/dL    Albumin 4.6 3.4 - 5.0 g/dL    Albumin/Globulin Ratio 1.6 1.1 - 2.2    Total Bilirubin 0.7 0.0 - 1.0 mg/dL    Alkaline Phosphatase 68 40 - 129 U/L    ALT 11 10 - 40 U/L    AST 16 15 - 37 U/L   HCG Qualitative, Serum   Result Value Ref Range hCG Qual Negative Detects HCG level >10 MIU/mL   Troponin   Result Value Ref Range    Troponin <0.01 <0.01 ng/mL   TSH   Result Value Ref Range    TSH 3.87 0.27 - 4.20 uIU/mL       Procedures  Procedures      MDM  Patient seen and evaluated. Relevant records reviewed. - Patient is a 40 y.o. female with multiple other comorbidities as listed below, presenting to the emergency department today with concerns of 1 episode of vomiting, 1 episode of diarrhea, concerns for clinical dehydration, poor p.o. intake, overall malaise, myalgias, and palpitations and a sporadic chest pain that feels like a poking sensation to her left lateral chest.  On my physical exam, is tearful, however I do not appreciate any concerning findings. Initial management includes IV fluids and Reglan; states Reglan works the best, and Zofran \"makes me pass out. \"  It is listed as an allergy. 2:24 AM  At this time, patient is no longer tearful, is feeling somewhat better, has 300 cc in. We will continue IV fluids. Laboratory evaluation all largely unremarkable. COVID-negative, CBC showing, without anemia or thrombocytopenia. CBC with minimal electrolyte changes. Negative pregnancy. Troponin, TSH both reassuring. Chest x-ray without acute process. We will move to discharge home soon. 2:24 AM  Had an episode of a heart rate down to 36. Blood pressure remained stable. She is largely asymptomatic. I discussed follow-up with cardiology with her, provided. However, I likely believe this is not significant today in the ED. Medications   0.9 % sodium chloride bolus (1,000 mLs IntraVENous New Bag 9/7/22 0041)   metoclopramide (REGLAN) injection 10 mg (10 mg IntraVENous Not Given 9/7/22 0041)       - I have a low concern for  other emergent process, and do not see indication for further work-up in the ER, as it is unlikely  and poses more risk than benefit.     - I discussed the results, including any incidental findings, with patient and family member patient and mother. Questions answered. Patient/family agreeable to plan and express understanding of plan. Disposition:  Discharge to home in fair condition. Is this patient to be included in the SEP-1 Core Measure due to severe sepsis or septic shock? No   Exclusion criteria - the patient is NOT to be included for SEP-1 Core Measure due to: Infection is not suspected      Clinical Impression:  1. Chest pain, unspecified type    2. Bradycardia    3. Vomiting and diarrhea    4. Dehydration, mild        Blood pressure 104/63, pulse 53, temperature 98.3 °F (36.8 °C), temperature source Oral, resp. rate 13, height 5' 7\" (1.702 m), weight 166 lb (75.3 kg), last menstrual period 08/23/2022, SpO2 97 %, not currently breastfeeding. Patient was given prescriptions for the following medications. I counseled patient how to take these medications. New Prescriptions    No medications on file       Disposition referral (if applicable):  [address]  [address] [facility]  [phone]    If symptoms worsen, As needed    Precious Gonzales MD  [address] [facility] [address]    Schedule an appointment as soon as possible for a visit       Rosendo Garvin, [facility] [address] [place]  [phone]    Schedule an appointment as soon as possible for a visit       Jennifer ANDRE DO, am the primary attending of record and contributed the majority of evaluation and treatment of emergent care for this encounter. This chart was generated in part by using Dragon Dictation system and may contain errors related to that system including errors in grammar, punctuation, and spelling, as well as words and phrases that may be inappropriate. If there are any questions or concerns please feel free to contact the dictating provider for clarification.      DO KACIE NEFF [facility] Care [facility],   09/07/22 0071

## 2022-09-07 NOTE — Clinical Note
Ori Tellez was seen and treated in our emergency department on 9/7/2022. She may return to work on 09/09/2022. If you have any questions or concerns, please don't hesitate to call.       [address] [facility], DO

## 2022-09-12 ENCOUNTER — TELEPHONE (OUTPATIENT)
Dept: FAMILY MEDICINE CLINIC | Age: 37
End: 2022-09-12

## 2022-09-12 DIAGNOSIS — E06.3 HYPOTHYROIDISM DUE TO HASHIMOTO'S THYROIDITIS: Primary | ICD-10-CM

## 2022-09-12 DIAGNOSIS — E03.8 HYPOTHYROIDISM DUE TO HASHIMOTO'S THYROIDITIS: Primary | ICD-10-CM

## 2022-09-12 NOTE — TELEPHONE ENCOUNTER
Patient asking to have her levothyroxine (SYNTHROID) 88 MCG   Increased right now. Patient was in the ER at [facility]   TSH Level  was 3.98   Please call pt at [phone]    Patient asking for a referral for a endocrinologist . Patient has appointment with a independent endocrinologist on 9-21-22 with  Dr. Justyn Romano and asking that Juliette Ayon write a referral.     I let patient know Juliette Ayon is out and I could schedule patient appointment on 9-20-22 to discuss changing her levothyroxine.

## 2022-10-24 ENCOUNTER — HOSPITAL ENCOUNTER (OUTPATIENT)
Age: 37
Discharge: HOME | End: 2022-10-24
Payer: COMMERCIAL

## 2022-10-24 LAB
A/G RATIO: 2.1 (ref 1.1–2.2)
ALBUMIN SERPL-MCNC: 4.5 G/DL (ref 3.4–5)
ALP BLD-CCNC: 61 U/L (ref 40–129)
ALT SERPL-CCNC: 10 U/L (ref 10–40)
ANION GAP SERPL CALCULATED.3IONS-SCNC: 13 MMOL/L (ref 3–16)
ANTI-THYROGLOB ABS: 118 IU/ML
AST SERPL-CCNC: 14 U/L (ref 15–37)
BILIRUB SERPL-MCNC: 0.4 MG/DL (ref 0–1)
BUN BLDV-MCNC: 23 MG/DL (ref 7–20)
CALCIUM SERPL-MCNC: 8.9 MG/DL (ref 8.3–10.6)
CHLORIDE BLD-SCNC: 103 MMOL/L (ref 99–110)
CO2: 22 MMOL/L (ref 21–32)
CREAT SERPL-MCNC: 0.8 MG/DL (ref 0.6–1.1)
GFR SERPL CREATININE-BSD FRML MDRD: >60 ML/MIN/{1.73_M2}
GLUCOSE BLD-MCNC: 100 MG/DL (ref 70–99)
POTASSIUM SERPL-SCNC: 4.3 MMOL/L (ref 3.5–5.1)
SODIUM BLD-SCNC: 138 MMOL/L (ref 136–145)
T3 FREE: 3.1 PG/ML (ref 2.3–4.2)
T4 FREE: 2 NG/DL (ref 0.9–1.8)
TOTAL PROTEIN: 6.6 G/DL (ref 6.4–8.2)
TSH SERPL DL<=0.05 MIU/L-ACNC: 0.1 UIU/ML (ref 0.27–4.2)
VITAMIN D 25-HYDROXY: 52.6 NG/ML

## 2022-10-24 PROCEDURE — 84481 FREE ASSAY (FT-3): CPT

## 2022-10-24 PROCEDURE — 80053 COMPREHEN METABOLIC PANEL: CPT

## 2022-10-24 PROCEDURE — 86800 THYROGLOBULIN ANTIBODY: CPT

## 2022-10-24 PROCEDURE — 86376 MICROSOMAL ANTIBODY EACH: CPT

## 2022-10-24 PROCEDURE — 82525 ASSAY OF COPPER: CPT

## 2022-10-24 PROCEDURE — 84443 ASSAY THYROID STIM HORMONE: CPT

## 2022-10-24 PROCEDURE — 83018 HEAVY METAL QUAN EACH NES: CPT

## 2022-10-24 PROCEDURE — 84439 ASSAY OF FREE THYROXINE: CPT

## 2022-10-24 PROCEDURE — 84630 ASSAY OF ZINC: CPT

## 2022-10-24 PROCEDURE — 83036 HEMOGLOBIN GLYCOSYLATED A1C: CPT

## 2022-10-24 PROCEDURE — 82306 VITAMIN D 25 HYDROXY: CPT

## 2022-10-25 LAB
ESTIMATED AVERAGE GLUCOSE: 96.8 MG/DL
HBA1C MFR BLD: 5 %

## 2022-10-26 ENCOUNTER — HOSPITAL ENCOUNTER (OUTPATIENT)
Age: 37
Discharge: HOME | End: 2022-10-26
Payer: COMMERCIAL

## 2022-10-26 LAB
REASON FOR REJECTION: NORMAL
REJECTED TEST: NORMAL

## 2022-10-26 PROCEDURE — 84630 ASSAY OF ZINC: CPT

## 2022-10-26 PROCEDURE — 82525 ASSAY OF COPPER: CPT

## 2022-10-26 PROCEDURE — 83018 HEAVY METAL QUAN EACH NES: CPT

## 2022-10-27 LAB — THYROID PEROXIDASE (TPO) ABS: 434.2 IU/ML (ref 0–9)

## 2022-10-29 LAB
COPPER: 95.1 UG/DL (ref 80–155)
IODINE, SERUM: 80.8 UG/L (ref 40–92)
ZINC: 72.8 UG/DL (ref 60–120)

## 2023-01-06 ENCOUNTER — HOSPITAL ENCOUNTER (OUTPATIENT)
Age: 38
Discharge: HOME | End: 2023-01-06
Payer: COMMERCIAL

## 2023-01-06 PROCEDURE — 84481 FREE ASSAY (FT-3): CPT

## 2023-01-06 PROCEDURE — 84439 ASSAY OF FREE THYROXINE: CPT

## 2023-01-06 PROCEDURE — 84443 ASSAY THYROID STIM HORMONE: CPT

## 2023-01-06 PROCEDURE — 80053 COMPREHEN METABOLIC PANEL: CPT

## 2023-01-07 LAB
A/G RATIO: 1.5 (ref 1.1–2.2)
ALBUMIN SERPL-MCNC: 4.2 G/DL (ref 3.4–5)
ALP BLD-CCNC: 72 U/L (ref 40–129)
ALT SERPL-CCNC: 15 U/L (ref 10–40)
ANION GAP SERPL CALCULATED.3IONS-SCNC: 13 MMOL/L (ref 3–16)
AST SERPL-CCNC: 20 U/L (ref 15–37)
BILIRUB SERPL-MCNC: 0.6 MG/DL (ref 0–1)
BUN BLDV-MCNC: 16 MG/DL (ref 7–20)
CALCIUM SERPL-MCNC: 9 MG/DL (ref 8.3–10.6)
CHLORIDE BLD-SCNC: 100 MMOL/L (ref 99–110)
CO2: 26 MMOL/L (ref 21–32)
CREAT SERPL-MCNC: 0.8 MG/DL (ref 0.6–1.1)
GFR SERPL CREATININE-BSD FRML MDRD: >60 ML/MIN/{1.73_M2}
GLUCOSE BLD-MCNC: 62 MG/DL (ref 70–99)
POTASSIUM SERPL-SCNC: 3.9 MMOL/L (ref 3.5–5.1)
SODIUM BLD-SCNC: 139 MMOL/L (ref 136–145)
T3 FREE: 2.3 PG/ML (ref 2.3–4.2)
T4 FREE: 1.8 NG/DL (ref 0.9–1.8)
TOTAL PROTEIN: 7 G/DL (ref 6.4–8.2)
TSH SERPL DL<=0.05 MIU/L-ACNC: 4.93 UIU/ML (ref 0.27–4.2)

## 2023-03-03 ENCOUNTER — TELEMEDICINE (OUTPATIENT)
Dept: FAMILY MEDICINE CLINIC | Age: 38
End: 2023-03-03
Payer: COMMERCIAL

## 2023-03-03 DIAGNOSIS — J01.90 ACUTE BACTERIAL SINUSITIS: Primary | ICD-10-CM

## 2023-03-03 DIAGNOSIS — B96.89 ACUTE BACTERIAL SINUSITIS: Primary | ICD-10-CM

## 2023-03-03 PROBLEM — D68.61 ANTIPHOSPHOLIPID SYNDROME (HCC): Status: ACTIVE | Noted: 2023-03-03

## 2023-03-03 PROBLEM — G45.9 TIA (TRANSIENT ISCHEMIC ATTACK): Status: ACTIVE | Noted: 2018-02-17

## 2023-03-03 PROCEDURE — 99213 OFFICE O/P EST LOW 20 MIN: CPT | Performed by: FAMILY MEDICINE

## 2023-03-03 RX ORDER — LEVOTHYROXINE SODIUM 112 UG/1
112 TABLET ORAL DAILY
COMMUNITY

## 2023-03-03 RX ORDER — METHYLPREDNISOLONE 4 MG/1
TABLET ORAL
Qty: 1 KIT | Refills: 0 | Status: SHIPPED | OUTPATIENT
Start: 2023-03-03 | End: 2023-03-09

## 2023-03-03 RX ORDER — BENZONATATE 100 MG/1
100 CAPSULE ORAL 3 TIMES DAILY PRN
Qty: 30 CAPSULE | Refills: 1 | Status: SHIPPED | OUTPATIENT
Start: 2023-03-03 | End: 2023-03-13

## 2023-03-03 RX ORDER — AMOXICILLIN AND CLAVULANATE POTASSIUM 875; 125 MG/1; MG/1
1 TABLET, FILM COATED ORAL 2 TIMES DAILY
Qty: 20 TABLET | Refills: 0 | Status: SHIPPED | OUTPATIENT
Start: 2023-03-03

## 2023-03-03 SDOH — ECONOMIC STABILITY: TRANSPORTATION INSECURITY
IN THE PAST 12 MONTHS, HAS LACK OF TRANSPORTATION KEPT YOU FROM MEETINGS, WORK, OR FROM GETTING THINGS NEEDED FOR DAILY LIVING?: NO

## 2023-03-03 SDOH — ECONOMIC STABILITY: FOOD INSECURITY: WITHIN THE PAST 12 MONTHS, YOU WORRIED THAT YOUR FOOD WOULD RUN OUT BEFORE YOU GOT MONEY TO BUY MORE.: NEVER TRUE

## 2023-03-03 SDOH — ECONOMIC STABILITY: FOOD INSECURITY: WITHIN THE PAST 12 MONTHS, THE FOOD YOU BOUGHT JUST DIDN'T LAST AND YOU DIDN'T HAVE MONEY TO GET MORE.: NEVER TRUE

## 2023-03-03 SDOH — ECONOMIC STABILITY: INCOME INSECURITY: HOW HARD IS IT FOR YOU TO PAY FOR THE VERY BASICS LIKE FOOD, HOUSING, MEDICAL CARE, AND HEATING?: NOT HARD AT ALL

## 2023-03-03 SDOH — ECONOMIC STABILITY: HOUSING INSECURITY
IN THE LAST 12 MONTHS, WAS THERE A TIME WHEN YOU DID NOT HAVE A STEADY PLACE TO SLEEP OR SLEPT IN A SHELTER (INCLUDING NOW)?: NO

## 2023-03-03 ASSESSMENT — ANXIETY QUESTIONNAIRES
1. FEELING NERVOUS, ANXIOUS, OR ON EDGE: 0
3. WORRYING TOO MUCH ABOUT DIFFERENT THINGS: 0
2. NOT BEING ABLE TO STOP OR CONTROL WORRYING: 0
7. FEELING AFRAID AS IF SOMETHING AWFUL MIGHT HAPPEN: 0
4. TROUBLE RELAXING: 0
5. BEING SO RESTLESS THAT IT IS HARD TO SIT STILL: 0
6. BECOMING EASILY ANNOYED OR IRRITABLE: 0
GAD7 TOTAL SCORE: 0

## 2023-03-03 ASSESSMENT — ENCOUNTER SYMPTOMS
SINUS PRESSURE: 1
COUGH: 1
SINUS PAIN: 1
RHINORRHEA: 1

## 2023-03-03 NOTE — PROGRESS NOTES
3/3/2023    TELEHEALTH EVALUATION -- Audio/Visual (During OYCQX-93 public health emergency)    HPI:    Vinnie Tate (:  1985) has requested an audio/video evaluation for the following concern(s):    Chief Complaint   Patient presents with    Otalgia     Left ear    Positive For Covid-19     Tested positive for COVID 23, takes Zyrtec daily for left ear drainage, admits to sinus pressure/pain, admits to purulent nasal discharge, cough is non-productive    Review of Systems   HENT:  Positive for congestion, ear pain (left), rhinorrhea, sinus pressure and sinus pain. Respiratory:  Positive for cough. Prior to Visit Medications    Medication Sig Taking? Authorizing Provider   levothyroxine (UNITHROID) 112 MCG tablet Take 112 mcg by mouth Daily Yes Historical Provider, MD   amoxicillin-clavulanate (AUGMENTIN) 875-125 MG per tablet Take 1 tablet by mouth 2 times daily Yes Huang Mo DO   methylPREDNISolone (MEDROL DOSEPACK) 4 MG tablet Take by mouth.  Yes Huang Mo DO   benzonatate (TESSALON PERLES) 100 MG capsule Take 1 capsule by mouth 3 times daily as needed for Cough Yes Huang Mo DO   levothyroxine (SYNTHROID) 88 MCG tablet Take 1 tablet by mouth Daily Yes Robert Trinh MD   Aspirin-Acetaminophen-Caffeine (EXCEDRIN MIGRAINE PO) Take by mouth Yes Historical Provider, MD   ibuprofen (ADVIL;MOTRIN) 600 MG tablet Take 1 tablet by mouth every 8 hours as needed for Pain Yes Frannei Washington MD   Multiple Vitamins-Minerals (MULTIVITAL PO) Take by mouth Yes Historical Provider, MD   Lactobacillus Rhamnosus, GG, (RA PROBIOTIC DIGESTIVE CARE) CAPS Take 1 capsule by mouth daily  Patient not taking: Reported on 3/3/2023  Historical Provider, MD   Probiotic Product (PROBIOTIC-10) CAPS Take by mouth daily  Patient not taking: Reported on 3/3/2023  Historical Provider, MD       Social History     Tobacco Use    Smoking status: Never    Smokeless tobacco: Never   Vaping Use Vaping Use: Never used   Substance Use Topics    Alcohol use: No    Drug use: No        Allergies   Allergen Reactions    Sulfa Antibiotics      Passed out    Zofran [Ondansetron]      Does not like how it makes her feel   ,   Past Medical History:   Diagnosis Date    Abnormal Pap smear of cervix     Colposcopy normal; pt states believes was med error-wrong patient    Allergic rhinitis     Anemia     during childhood    Dizziness     Headache     Hearing loss     Hypertension     PIH with 1st pregnancy; no pre-eclampsia-labs all WNL    Hypothyroidism     Polycystic disease, ovaries     TIA (transient ischemic attack) 02/16/2018    Tinnitus     Bilat tinnitus   ,   Past Surgical History:   Procedure Laterality Date    SHOULDER SURGERY      left shoulder    WISDOM TOOTH EXTRACTION         PHYSICAL EXAMINATION:  [ INSTRUCTIONS:  \"[x]\" Indicates a positive item  \"[]\" Indicates a negative item  -- DELETE ALL ITEMS NOT EXAMINED]  Vital Signs: (As obtained by patient/caregiver or practitioner observation)    Height  -  5' 7\"           Weight -    170 lb          Blood pressure-  110/66      Heart rate-  63   Temperature- 98.9 F  SpO2 - 97 %     Constitutional: [x] Appears well-developed and well-nourished [x] No apparent distress      [] Abnormal-   Mental status  [x] Alert and awake  [x] Oriented to person/place/time [x]Able to follow commands      Eyes:  EOM    [x]  Normal  [] Abnormal-  Sclera  []  Normal  [] Abnormal -         Discharge []  None visible  [] Abnormal -    HENT:   [x] Normocephalic, atraumatic.   [] Abnormal   [] Mouth/Throat: Mucous membranes are moist.     External Ears [x] Normal  [] Abnormal-     Neck: [x] No visualized mass     Pulmonary/Chest: [x] Respiratory effort normal.  [x] No visualized signs of difficulty breathing or respiratory distress        [] Abnormal-      Musculoskeletal:   [] Normal gait with no signs of ataxia         [x] Normal range of motion of neck        [] Abnormal- Neurological:        [x] No Facial Asymmetry (Cranial nerve 7 motor function) (limited exam to video visit)          [x] No gaze palsy        [] Abnormal-         Skin:        [x] No significant exanthematous lesions or discoloration noted on facial skin         [] Abnormal-            Psychiatric:       [x] Normal Affect [] No Hallucinations        [] Abnormal-     Other pertinent observable physical exam findings-     ASSESSMENT/PLAN:   Diagnosis Orders   1. Acute bacterial sinusitis  amoxicillin-clavulanate (AUGMENTIN) 875-125 MG per tablet    methylPREDNISolone (MEDROL DOSEPACK) 4 MG tablet    benzonatate (TESSALON PERLES) 100 MG capsule          Return if symptoms worsen or fail to improve. Ranjan Wilson, was evaluated through a synchronous (real-time) audio-video encounter. The patient (or guardian if applicable) is aware that this is a billable service. Verbal consent to proceed has been obtained within the past 12 months. The visit was conducted pursuant to the emergency declaration under the [address] [place] [address] authority and the [place] [employer] and [place] General Act. Patient identification was verified, and a caregiver was present when appropriate. The patient was located in a state where the provider was credentialed to provide care. Total time spent on this encounter: Not billed by time    --Ananya Canada DO on 3/3/2023 at 2:41 PM    An electronic signature was used to authenticate this note.

## 2024-02-27 ENCOUNTER — OFFICE VISIT (OUTPATIENT)
Dept: FAMILY MEDICINE CLINIC | Age: 39
End: 2024-02-27

## 2024-02-27 VITALS
RESPIRATION RATE: 16 BRPM | SYSTOLIC BLOOD PRESSURE: 129 MMHG | BODY MASS INDEX: 24.71 KG/M2 | HEIGHT: 68 IN | DIASTOLIC BLOOD PRESSURE: 80 MMHG | HEART RATE: 65 BPM | WEIGHT: 163 LBS | OXYGEN SATURATION: 99 % | TEMPERATURE: 97.1 F

## 2024-02-27 DIAGNOSIS — E06.3 HYPOTHYROIDISM DUE TO HASHIMOTO'S THYROIDITIS: ICD-10-CM

## 2024-02-27 DIAGNOSIS — R19.7 DIARRHEA, UNSPECIFIED TYPE: Primary | ICD-10-CM

## 2024-02-27 DIAGNOSIS — R14.0 ABDOMINAL BLOATING: ICD-10-CM

## 2024-02-27 DIAGNOSIS — E03.8 HYPOTHYROIDISM DUE TO HASHIMOTO'S THYROIDITIS: ICD-10-CM

## 2024-02-27 DIAGNOSIS — R19.7 DIARRHEA, UNSPECIFIED TYPE: ICD-10-CM

## 2024-02-27 PROCEDURE — 99213 OFFICE O/P EST LOW 20 MIN: CPT | Performed by: STUDENT IN AN ORGANIZED HEALTH CARE EDUCATION/TRAINING PROGRAM

## 2024-02-27 SDOH — ECONOMIC STABILITY: FOOD INSECURITY: WITHIN THE PAST 12 MONTHS, THE FOOD YOU BOUGHT JUST DIDN'T LAST AND YOU DIDN'T HAVE MONEY TO GET MORE.: NEVER TRUE

## 2024-02-27 SDOH — ECONOMIC STABILITY: FOOD INSECURITY: WITHIN THE PAST 12 MONTHS, YOU WORRIED THAT YOUR FOOD WOULD RUN OUT BEFORE YOU GOT MONEY TO BUY MORE.: NEVER TRUE

## 2024-02-27 SDOH — ECONOMIC STABILITY: INCOME INSECURITY: HOW HARD IS IT FOR YOU TO PAY FOR THE VERY BASICS LIKE FOOD, HOUSING, MEDICAL CARE, AND HEATING?: NOT HARD AT ALL

## 2024-02-27 ASSESSMENT — PATIENT HEALTH QUESTIONNAIRE - PHQ9
1. LITTLE INTEREST OR PLEASURE IN DOING THINGS: 0
SUM OF ALL RESPONSES TO PHQ QUESTIONS 1-9: 0
SUM OF ALL RESPONSES TO PHQ9 QUESTIONS 1 & 2: 0
SUM OF ALL RESPONSES TO PHQ QUESTIONS 1-9: 0
SUM OF ALL RESPONSES TO PHQ QUESTIONS 1-9: 0
2. FEELING DOWN, DEPRESSED OR HOPELESS: 0
SUM OF ALL RESPONSES TO PHQ QUESTIONS 1-9: 0

## 2024-02-27 NOTE — PATIENT INSTRUCTIONS
- Stool tests - bring back , date and time of collection   - Blood tests   - Can try OTC Immodium 1-2 times for diarrhea   - increase fluid / electrolytes

## 2024-02-27 NOTE — PROGRESS NOTES
[facility]  [address]  [address]  [place], [place] [address]  Tel: [phone]      2024   SUBJECTIVE/OBJECTIVE  HPI    Keisha Miranda (:  1985) is a 38 y.o. female, here for evaluation of the following medical concerns:  Chief Complaint   Patient presents with    Diarrhea     X 1 week     Patient is a 38 y.o. female  has a past medical history of Abnormal Pap smear of cervix, Allergic rhinitis, Anemia, Dizziness, Headache, Hearing loss, Hypertension, Hypothyroidism, Polycystic disease, ovaries, TIA (transient ischemic attack), and Tinnitus. who presents with acute diarrhea.    Diarrhea   This is a new problem. Episode onset: 1-2 weeks, started loose and now watery. The problem occurs 2 to 4 times per day (at least 3 times daily). The problem has been gradually worsening (burning). The stool consistency is described as Watery and blood tinged (green, yellow tan). Associated symptoms include notable bloating/gas, nausea, abdominal pain (intermittently.), headaches, increased flatus and weight loss. Pertinent negatives include no arthralgias, bloating, chills, coughing, fever, myalgias, URI or vomiting.    Nothing aggravates the symptoms. She has tried nothing for the symptoms. There is no history of bowel resection, inflammatory bowel disease, irritable bowel syndrome, malabsorption, a recent abdominal surgery or short gut syndrome.   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  Minimal appetite, ate 2 eggs.  Denies any recent travel or exposure to new foods.  History of son with ulcerative colitis.  Avoids all gluten.  Diet: meat, vegetables, fruit, fermented yogurt and nut flour .Since    Also endorses feeling off for the past month.  In mid January, reports flare up of eczema on toes and face.  Was started on oral prednisone.  Prior to eczema flare up had cold sores.  Weight loss - 10 lbs per patient, 4 TUMs.     Hashimoto's thyroiditis-

## 2024-02-28 DIAGNOSIS — R19.7 DIARRHEA, UNSPECIFIED TYPE: ICD-10-CM

## 2024-02-28 LAB
ALBUMIN SERPL-MCNC: 4.2 G/DL (ref 3.4–5)
ALBUMIN/GLOB SERPL: 2 {RATIO} (ref 1.1–2.2)
ALP SERPL-CCNC: 83 U/L (ref 40–129)
ALT SERPL-CCNC: 10 U/L (ref 10–40)
ANION GAP SERPL CALCULATED.3IONS-SCNC: 9 MMOL/L (ref 3–16)
ANISOCYTOSIS BLD QL SMEAR: ABNORMAL
AST SERPL-CCNC: 17 U/L (ref 15–37)
BASOPHILS # BLD: 0 K/UL (ref 0–0.2)
BASOPHILS NFR BLD: 0.6 %
BILIRUB SERPL-MCNC: 0.5 MG/DL (ref 0–1)
BUN SERPL-MCNC: 15 MG/DL (ref 7–20)
C DIFF TOX A+B STL QL IA: NORMAL
CALCIUM SERPL-MCNC: 8.7 MG/DL (ref 8.3–10.6)
CHLORIDE SERPL-SCNC: 103 MMOL/L (ref 99–110)
CO2 SERPL-SCNC: 26 MMOL/L (ref 21–32)
CREAT SERPL-MCNC: 0.9 MG/DL (ref 0.6–1.1)
CRP SERPL-MCNC: <3 MG/L (ref 0–5.1)
DEPRECATED RDW RBC AUTO: 13.2 % (ref 12.4–15.4)
EOSINOPHIL # BLD: 0.3 K/UL (ref 0–0.6)
EOSINOPHIL NFR BLD: 3.7 %
ERYTHROCYTE [SEDIMENTATION RATE] IN BLOOD BY WESTERGREN METHOD: 3 MM/HR (ref 0–20)
GFR SERPLBLD CREATININE-BSD FMLA CKD-EPI: >60 ML/MIN/{1.73_M2}
GLUCOSE SERPL-MCNC: 90 MG/DL (ref 70–99)
HCT VFR BLD AUTO: 41.1 % (ref 36–48)
HGB BLD-MCNC: 14.3 G/DL (ref 12–16)
LYMPHOCYTES # BLD: 1.8 K/UL (ref 1–5.1)
LYMPHOCYTES NFR BLD: 23.3 %
MCH RBC QN AUTO: 30.2 PG (ref 26–34)
MCHC RBC AUTO-ENTMCNC: 34.9 G/DL (ref 31–36)
MCV RBC AUTO: 86.7 FL (ref 80–100)
MONOCYTES # BLD: 0.3 K/UL (ref 0–1.3)
MONOCYTES NFR BLD: 4.4 %
NEUTROPHILS # BLD: 5.3 K/UL (ref 1.7–7.7)
NEUTROPHILS NFR BLD: 68 %
OVALOCYTES BLD QL SMEAR: ABNORMAL
PLATELET # BLD AUTO: 169 K/UL (ref 135–450)
PLATELET BLD QL SMEAR: ADEQUATE
PMV BLD AUTO: 11.3 FL (ref 5–10.5)
POIKILOCYTOSIS BLD QL SMEAR: ABNORMAL
POTASSIUM SERPL-SCNC: 3.7 MMOL/L (ref 3.5–5.1)
PROT SERPL-MCNC: 6.3 G/DL (ref 6.4–8.2)
RBC # BLD AUTO: 4.74 M/UL (ref 4–5.2)
SLIDE REVIEW: ABNORMAL
SODIUM SERPL-SCNC: 138 MMOL/L (ref 136–145)
WBC # BLD AUTO: 7.7 K/UL (ref 4–11)

## 2024-02-29 DIAGNOSIS — R71.8 ANISOCYTOSIS: Primary | ICD-10-CM

## 2024-02-29 LAB
GI PATHOGENS PNL STL NAA+PROBE: NORMAL
H PYLORI BREATH TEST: NEGATIVE

## 2024-03-03 ENCOUNTER — PATIENT MESSAGE (OUTPATIENT)
Dept: FAMILY MEDICINE CLINIC | Age: 39
End: 2024-03-03

## 2024-03-03 DIAGNOSIS — R19.7 DIARRHEA, UNSPECIFIED TYPE: ICD-10-CM

## 2024-03-03 DIAGNOSIS — R14.0 ABDOMINAL BLOATING: Primary | ICD-10-CM

## 2024-03-03 LAB — CALPROTECTIN STL-MCNT: 33 UG/G

## 2024-03-04 NOTE — TELEPHONE ENCOUNTER
From: Keisha Miranda  To: Dr. Vania Augustine  Sent: 3/3/2024 8:11 PM EST  Subject: Referral    I am still continuing to have abdominal pain, cramping, burning, diarrhea, etc. would you please send me a referral to Dr Arias with GI.

## 2024-03-06 DIAGNOSIS — R14.0 ABDOMINAL BLOATING: ICD-10-CM

## 2024-03-06 DIAGNOSIS — R19.7 DIARRHEA, UNSPECIFIED TYPE: ICD-10-CM

## 2024-03-07 ENCOUNTER — TELEPHONE (OUTPATIENT)
Dept: FAMILY MEDICINE CLINIC | Age: 39
End: 2024-03-07

## 2024-03-07 DIAGNOSIS — A07.1 GIARDIA: Primary | ICD-10-CM

## 2024-03-07 DIAGNOSIS — A07.2 DIARRHEA DUE TO CRYPTOSPORIDIUM (HCC): ICD-10-CM

## 2024-03-07 LAB
CRYPTOSP AG STL QL IA: ABNORMAL
E HISTOLYT AG STL QL IA: ABNORMAL
G LAMBLIA AG STL QL IA: ABNORMAL
ORGANISM: ABNORMAL
ORGANISM: ABNORMAL

## 2024-03-07 RX ORDER — NITAZOXANIDE 500 MG/1
500 TABLET ORAL 2 TIMES DAILY WITH MEALS
Qty: 6 TABLET | Refills: 0 | Status: SHIPPED | OUTPATIENT
Start: 2024-03-07 | End: 2024-03-10

## 2024-03-07 NOTE — TELEPHONE ENCOUNTER
Lab called re O&P SCREEN(CRYPTOSPORIDIUM/GIARDIA/E.HISTOLYTICA)     Test came back positive for Cryptosporidium Antigen and Giardia Antigen     Please Review the results

## 2024-03-14 ENCOUNTER — PATIENT MESSAGE (OUTPATIENT)
Dept: FAMILY MEDICINE CLINIC | Age: 39
End: 2024-03-14

## 2024-03-14 DIAGNOSIS — R19.7 DIARRHEA, UNSPECIFIED TYPE: ICD-10-CM

## 2024-03-14 DIAGNOSIS — A07.1 GIARDIA: Primary | ICD-10-CM

## 2024-03-14 NOTE — TELEPHONE ENCOUNTER
From: Keisha Miranda  To: Dr. Vania Augustine  Sent: 3/14/2024 4:28 AM EDT  Subject: Retest stool    I completed my course of medication for the giardia and crypto on Monday morning. Yesterday(Wednesday) I am back to having explosive diarrhea and went 8 times throughout the day. Every time I eat or drink I am having to go. It appears like prior to treatment. Do I need to get another stool test to see if I still have infection?

## 2024-03-15 DIAGNOSIS — R19.7 DIARRHEA, UNSPECIFIED TYPE: ICD-10-CM

## 2024-03-15 DIAGNOSIS — A07.1 GIARDIA: ICD-10-CM

## 2024-03-16 DIAGNOSIS — A07.1 GIARDIA: Primary | ICD-10-CM

## 2024-03-16 LAB
CRYPTOSP AG STL QL IA: ABNORMAL
E HISTOLYT AG STL QL IA: ABNORMAL
G LAMBLIA AG STL QL IA: ABNORMAL
ORGANISM: ABNORMAL

## 2024-03-16 RX ORDER — TINIDAZOLE 500 MG/1
2 TABLET ORAL ONCE
Qty: 4 TABLET | Refills: 0 | Status: SHIPPED | OUTPATIENT
Start: 2024-03-16 | End: 2024-03-16

## 2024-04-11 ENCOUNTER — OFFICE VISIT (OUTPATIENT)
Dept: OBGYN CLINIC | Age: 39
End: 2024-04-11

## 2024-04-11 VITALS
WEIGHT: 160 LBS | SYSTOLIC BLOOD PRESSURE: 120 MMHG | HEIGHT: 68 IN | DIASTOLIC BLOOD PRESSURE: 84 MMHG | TEMPERATURE: 98.4 F | BODY MASS INDEX: 24.25 KG/M2

## 2024-04-11 DIAGNOSIS — G45.9 TIA (TRANSIENT ISCHEMIC ATTACK): ICD-10-CM

## 2024-04-11 DIAGNOSIS — Z87.59 HX OF ONE MISCARRIAGE: ICD-10-CM

## 2024-04-11 DIAGNOSIS — Z01.419 WOMEN'S ANNUAL ROUTINE GYNECOLOGICAL EXAMINATION: Primary | ICD-10-CM

## 2024-04-11 DIAGNOSIS — Z12.4 PAP SMEAR FOR CERVICAL CANCER SCREENING: ICD-10-CM

## 2024-04-11 DIAGNOSIS — Z87.42 HX OF ABNORMAL CERVICAL PAP SMEAR: ICD-10-CM

## 2024-04-11 DIAGNOSIS — D68.61 ANTIPHOSPHOLIPID SYNDROME (HCC): ICD-10-CM

## 2024-04-11 DIAGNOSIS — Z12.39 SCREENING BREAST EXAMINATION: ICD-10-CM

## 2024-04-11 NOTE — PROGRESS NOTES
attack)        7. Antiphospholipid syndrome (HCC)           - normal breast and pelvic exam   - PAP pending   - return precautions reviewed      Annual Visit Recommendations:   Self-breast exam and self-breast awareness reviewed.     Pelvic exam was done and ASCCP guidelines reviewed   Reviewed healthy diet and daily multivitamin use.     Reviewed recommendations on Calcium and Vitamin D intake.  Discussed seatbelt use.     Follow Up  - Will call patient with results   -Return in about 1 year (around 4/11/2025) for Annual or sooner if needed.     Jeimy Castillo, DO

## 2024-04-12 LAB — HPV16+18+H RISK 12 DNA SPEC-IMP: NORMAL

## 2024-04-17 LAB
HPV HR 12 DNA SPEC QL NAA+PROBE: DETECTED
HPV16 DNA SPEC QL NAA+PROBE: NOT DETECTED
HPV16+18+H RISK 12 DNA SPEC-IMP: ABNORMAL
HPV18 DNA SPEC QL NAA+PROBE: NOT DETECTED

## 2024-12-08 ENCOUNTER — HOSPITAL ENCOUNTER (EMERGENCY)
Age: 39
Discharge: HOME | End: 2024-12-08
Attending: EMERGENCY MEDICINE
Payer: COMMERCIAL

## 2024-12-08 VITALS
RESPIRATION RATE: 15 BRPM | BODY MASS INDEX: 25.9 KG/M2 | OXYGEN SATURATION: 100 % | SYSTOLIC BLOOD PRESSURE: 111 MMHG | DIASTOLIC BLOOD PRESSURE: 73 MMHG | WEIGHT: 165 LBS | HEART RATE: 59 BPM | TEMPERATURE: 97.8 F | HEIGHT: 67 IN

## 2024-12-08 DIAGNOSIS — E83.42 HYPOMAGNESEMIA: ICD-10-CM

## 2024-12-08 DIAGNOSIS — R11.2 NAUSEA AND VOMITING, UNSPECIFIED VOMITING TYPE: Primary | ICD-10-CM

## 2024-12-08 LAB
ALBUMIN SERPL-MCNC: 4.1 G/DL (ref 3.4–5)
ALBUMIN/GLOB SERPL: 1.7 {RATIO} (ref 1.1–2.2)
ALP SERPL-CCNC: 68 U/L (ref 40–129)
ALT SERPL-CCNC: 14 U/L (ref 10–40)
AMORPH SED URNS QL MICRO: ABNORMAL /HPF
ANION GAP SERPL CALCULATED.3IONS-SCNC: 12 MMOL/L (ref 3–16)
AST SERPL-CCNC: 21 U/L (ref 15–37)
BASOPHILS # BLD: 0 K/UL (ref 0–0.2)
BASOPHILS NFR BLD: 0.6 %
BILIRUB SERPL-MCNC: 0.5 MG/DL (ref 0–1)
BILIRUB UR QL STRIP.AUTO: NEGATIVE
BUN SERPL-MCNC: 15 MG/DL (ref 7–20)
CALCIUM SERPL-MCNC: 8.3 MG/DL (ref 8.3–10.6)
CHLORIDE SERPL-SCNC: 102 MMOL/L (ref 99–110)
CLARITY UR: ABNORMAL
CO2 SERPL-SCNC: 22 MMOL/L (ref 21–32)
COLOR UR: YELLOW
CREAT SERPL-MCNC: 0.7 MG/DL (ref 0.6–1.1)
DEPRECATED RDW RBC AUTO: 12.6 % (ref 12.4–15.4)
EOSINOPHIL # BLD: 0.1 K/UL (ref 0–0.6)
EOSINOPHIL NFR BLD: 1.5 %
EPI CELLS #/AREA URNS HPF: ABNORMAL /HPF (ref 0–5)
GFR SERPLBLD CREATININE-BSD FMLA CKD-EPI: >90 ML/MIN/{1.73_M2}
GLUCOSE SERPL-MCNC: 117 MG/DL (ref 70–99)
GLUCOSE UR STRIP.AUTO-MCNC: NEGATIVE MG/DL
HCG SERPL QL: NEGATIVE
HCT VFR BLD AUTO: 41.6 % (ref 36–48)
HGB BLD-MCNC: 14.7 G/DL (ref 12–16)
HGB UR QL STRIP.AUTO: NEGATIVE
KETONES UR STRIP.AUTO-MCNC: >=80 MG/DL
LACTATE BLDV-SCNC: 1.4 MMOL/L (ref 0.4–1.9)
LEUKOCYTE ESTERASE UR QL STRIP.AUTO: NEGATIVE
LYMPHOCYTES # BLD: 1.3 K/UL (ref 1–5.1)
LYMPHOCYTES NFR BLD: 17.6 %
MAGNESIUM SERPL-MCNC: 1.7 MG/DL (ref 1.8–2.4)
MCH RBC QN AUTO: 30.3 PG (ref 26–34)
MCHC RBC AUTO-ENTMCNC: 35.5 G/DL (ref 31–36)
MCV RBC AUTO: 85.5 FL (ref 80–100)
MONOCYTES # BLD: 0.4 K/UL (ref 0–1.3)
MONOCYTES NFR BLD: 5.5 %
MUCOUS THREADS #/AREA URNS LPF: ABNORMAL /LPF
NEUTROPHILS # BLD: 5.4 K/UL (ref 1.7–7.7)
NEUTROPHILS NFR BLD: 74.8 %
NITRITE UR QL STRIP.AUTO: NEGATIVE
PATH INTERP BLD-IMP: NO
PH UR STRIP.AUTO: 8.5 [PH] (ref 5–8)
PLATELET # BLD AUTO: 136 K/UL (ref 135–450)
PLATELET BLD QL SMEAR: ADEQUATE
PMV BLD AUTO: 10.5 FL (ref 5–10.5)
POTASSIUM SERPL-SCNC: 3.5 MMOL/L (ref 3.5–5.1)
PROT SERPL-MCNC: 6.5 G/DL (ref 6.4–8.2)
PROT UR STRIP.AUTO-MCNC: ABNORMAL MG/DL
RBC # BLD AUTO: 4.86 M/UL (ref 4–5.2)
RBC #/AREA URNS HPF: ABNORMAL /HPF (ref 0–4)
SLIDE REVIEW: NORMAL
SODIUM SERPL-SCNC: 136 MMOL/L (ref 136–145)
SP GR UR STRIP.AUTO: 1.02 (ref 1–1.03)
UA COMPLETE W REFLEX CULTURE PNL UR: ABNORMAL
UA DIPSTICK W REFLEX MICRO PNL UR: YES
URN SPEC COLLECT METH UR: ABNORMAL
UROBILINOGEN UR STRIP-ACNC: 0.2 E.U./DL
WBC # BLD AUTO: 7.3 K/UL (ref 4–11)
WBC #/AREA URNS HPF: ABNORMAL /HPF (ref 0–5)

## 2024-12-08 PROCEDURE — 99284 EMERGENCY DEPT VISIT MOD MDM: CPT

## 2024-12-08 PROCEDURE — 80053 COMPREHEN METABOLIC PANEL: CPT

## 2024-12-08 PROCEDURE — 6370000000 HC RX 637 (ALT 250 FOR IP): Performed by: EMERGENCY MEDICINE

## 2024-12-08 PROCEDURE — 96361 HYDRATE IV INFUSION ADD-ON: CPT

## 2024-12-08 PROCEDURE — 96374 THER/PROPH/DIAG INJ IV PUSH: CPT

## 2024-12-08 PROCEDURE — 84703 CHORIONIC GONADOTROPIN ASSAY: CPT

## 2024-12-08 PROCEDURE — 83605 ASSAY OF LACTIC ACID: CPT

## 2024-12-08 PROCEDURE — 81001 URINALYSIS AUTO W/SCOPE: CPT

## 2024-12-08 PROCEDURE — 85025 COMPLETE CBC W/AUTO DIFF WBC: CPT

## 2024-12-08 PROCEDURE — 83735 ASSAY OF MAGNESIUM: CPT

## 2024-12-08 PROCEDURE — 2580000003 HC RX 258: Performed by: REGISTERED NURSE

## 2024-12-08 PROCEDURE — 6360000002 HC RX W HCPCS: Performed by: REGISTERED NURSE

## 2024-12-08 RX ORDER — METOCLOPRAMIDE 10 MG/1
10 TABLET ORAL
Qty: 15 TABLET | Refills: 0 | Status: SHIPPED | OUTPATIENT
Start: 2024-12-08 | End: 2024-12-13

## 2024-12-08 RX ORDER — LANOLIN ALCOHOL/MO/W.PET/CERES
400 CREAM (GRAM) TOPICAL
Status: COMPLETED | OUTPATIENT
Start: 2024-12-08 | End: 2024-12-08

## 2024-12-08 RX ORDER — 0.9 % SODIUM CHLORIDE 0.9 %
1000 INTRAVENOUS SOLUTION INTRAVENOUS ONCE
Status: COMPLETED | OUTPATIENT
Start: 2024-12-08 | End: 2024-12-08

## 2024-12-08 RX ORDER — METOCLOPRAMIDE HYDROCHLORIDE 5 MG/ML
10 INJECTION INTRAMUSCULAR; INTRAVENOUS ONCE
Status: COMPLETED | OUTPATIENT
Start: 2024-12-08 | End: 2024-12-08

## 2024-12-08 RX ADMIN — Medication 400 MG: at 21:08

## 2024-12-08 RX ADMIN — METOCLOPRAMIDE 10 MG: 5 INJECTION, SOLUTION INTRAMUSCULAR; INTRAVENOUS at 19:56

## 2024-12-08 RX ADMIN — SODIUM CHLORIDE 1000 ML: 9 INJECTION, SOLUTION INTRAVENOUS at 19:57

## 2024-12-08 ASSESSMENT — LIFESTYLE VARIABLES
HOW OFTEN DO YOU HAVE A DRINK CONTAINING ALCOHOL: NEVER
HOW MANY STANDARD DRINKS CONTAINING ALCOHOL DO YOU HAVE ON A TYPICAL DAY: PATIENT DOES NOT DRINK

## 2024-12-09 NOTE — ED NOTES
RN provided patient with discharge instructions, any prescriptions and possible side effects.  Instructions, dosing, and follow-up appointments reviewed with patient/family. No further questions or needs at this time.   --Vital signs stable and pt discharged without difficulty.   Tatum HARGROVE

## 2024-12-09 NOTE — ED PROVIDER NOTES
In addition to the advanced practice provider, I personally saw Keisha Miranda and performed a substantive portion of the visit including all aspects of the medical decision making.    Medical Decision Making  39-year-old female PMH including HTN, anemia, hypothyroidism, TIA, antiphospholipid syndrome presents via private vehicle for evaluation of suspected dehydration.  States that she has had nausea today without emesis.  Some diffuse weakness, and states she did have onset of lightheadedness today after raking leaves.  States she becomes easily dehydrated.  Took Dramamine earlier today with partial improvement.  Denies other symptoms including measured fevers, diarrhea, vomiting, urinary symptoms, chest pain, shortness of breath.  Denies focal weakness.  Physical exam is normal.  There are no focal neurologic deficits.  Abdomen is nontender.  Stable during course.  Vital signs are normal.  Afebrile.  Labs remarkable for magnesium 1.7, glucose 117, others, including CBC, electrolytes, LFTs, lactate, UA within normal limits.  Received IV Reglan, 1 L NS, oral magnesium and.  Patient states Reglan does work well for nausea.  Allergy to Zofran.  Does not have any Reglan at home.  On reassessment, patient states symptoms are improved.  He is tolerating oral intake.  Denies further symptoms.  At this time feel that patient may be discharged and will prescribe Reglan as needed for symptomatic treatment of nausea.  Recommend increased oral fluid intake.  Discussed findings and plan of care with patient who expressed understanding and agreement with plan.  Recommended follow-up and return to ED with any new or worsening symptoms as specified on discharge instructions.      SEP-1  Is this patient to be included in the SEP-1 Core Measure due to severe sepsis or septic shock?   No Exclusion criteria - the patient is NOT to be included for SEP-1 Core Measure due to: Infection is not suspected    Screenings  NIH Stroke 
be included in the SEP-1 Core Measure due to severe sepsis or septic shock?   No     Exclusion criteria - the patient is NOT to be included for SEP-1 Core Measure due to:  Infection is not suspected    Discharge Time out:  CC Reviewed Yes   Test Results Yes     Vitals:    12/08/24 1936   BP:    Pulse:    Resp:    Temp: 98.3 °F (36.8 °C)   SpO2:               FINAL IMPRESSION      1. Nausea and vomiting, unspecified vomiting type    2. Hypomagnesemia          DISPOSITION/PLAN   DISPOSITION Decision To Discharge 12/08/2024 09:41:38 PM   DISPOSITION CONDITION Stable           PATIENT REFERREDTO:  Tati Dugan MD  [address] [facility] [address]  [phone]    In 2 days  Re-evaluation    [facility] ED  [address] [facility] [address] [phone]    As needed, If symptoms worsen      DISCHARGE MEDICATIONS:  New Prescriptions    METOCLOPRAMIDE (REGLAN) 10 MG TABLET    Take 1 tablet by mouth 3 times daily (with meals) for 5 days       DISCONTINUED MEDICATIONS:  Discontinued Medications    No medications on file              (Please note that portions ofthis note were completed with a voice recognition program.  Efforts were made to edit the dictations but occasionally words are mis-transcribed.)    ADA Villegas CNP (electronically signed)       Sandi Hernandez APRN - CNP  12/08/24 3425

## 2024-12-10 ENCOUNTER — OFFICE VISIT (OUTPATIENT)
Dept: INTERNAL MEDICINE CLINIC | Age: 39
End: 2024-12-10

## 2024-12-10 VITALS
BODY MASS INDEX: 26.37 KG/M2 | RESPIRATION RATE: 18 BRPM | SYSTOLIC BLOOD PRESSURE: 110 MMHG | HEART RATE: 65 BPM | WEIGHT: 168 LBS | HEIGHT: 67 IN | DIASTOLIC BLOOD PRESSURE: 75 MMHG

## 2024-12-10 DIAGNOSIS — Z76.89 ENCOUNTER TO ESTABLISH CARE: Primary | ICD-10-CM

## 2024-12-10 DIAGNOSIS — E06.3 HASHIMOTO'S DISEASE: ICD-10-CM

## 2024-12-10 PROCEDURE — 99202 OFFICE O/P NEW SF 15 MIN: CPT | Performed by: INTERNAL MEDICINE

## 2024-12-10 RX ORDER — TRIAMCINOLONE ACETONIDE 0.25 MG/G
OINTMENT TOPICAL
Qty: 30 G | Refills: 1 | Status: SHIPPED | OUTPATIENT
Start: 2024-12-10 | End: 2024-12-17

## 2024-12-10 ASSESSMENT — PATIENT HEALTH QUESTIONNAIRE - PHQ9
SUM OF ALL RESPONSES TO PHQ QUESTIONS 1-9: 0
SUM OF ALL RESPONSES TO PHQ9 QUESTIONS 1 & 2: 0
2. FEELING DOWN, DEPRESSED OR HOPELESS: NOT AT ALL
SUM OF ALL RESPONSES TO PHQ QUESTIONS 1-9: 0
SUM OF ALL RESPONSES TO PHQ QUESTIONS 1-9: 0
1. LITTLE INTEREST OR PLEASURE IN DOING THINGS: NOT AT ALL
SUM OF ALL RESPONSES TO PHQ QUESTIONS 1-9: 0

## 2024-12-10 NOTE — PROGRESS NOTES
Keisha Miranda (:  1985) is a 39 y.o. female,New patient, here for evaluation of the following chief complaint(s):  No chief complaint on file.         Assessment & Plan           Subjective   HPI  39 y.o. 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 Amarousis fugax was dx, Ct head, CTa head and neck neg, MRI brain neg   and seen hematology , workup showed positive anticardiolipin IGM and equivocal APL antibody . Recommended anticoagulation and pt deferred as it did not happen any more, took ASA for few yrs and now off with no recurrence of issues  JENNIFER done and was neg     Recently was working in yard picking up leaves and suddenly felt symptoms of left sided ringing, dizziness followed by profuse emesis spells . Took dramamine with no help and seen in ER for dehydration and IVF   Improved symptoms within a day with no recurrence     Today she is worried about missing some neurological or harmonal dx     No recent fevers or chills. No head injury, headache or blurry vision or loss of vision  Activity back to normal with some residual ringing in left ear     No recent weight loss or weight gain   Non smoker, no alcohol use  No pulmonary or cardiac issues    No depression issues  Has 2 kids , one of then with Ulcerative colitis   Works as a ICu nurse   Lives with family     Allergies   Allergen Reactions    Sulfa Antibiotics      Passed out    Zofran [Ondansetron]      Does not like how it makes her feel     Past Medical History:   Diagnosis Date    Abnormal Pap smear of cervix     Colposcopy normal; pt states believes was med error-wrong patient    Allergic rhinitis     Anemia     during childhood    Dizziness     Headache     Hearing loss     Hypertension     PIH with 1st pregnancy; no

## 2024-12-16 ENCOUNTER — HOSPITAL ENCOUNTER (OUTPATIENT)
Age: 39
Discharge: HOME | End: 2024-12-16

## 2024-12-16 DIAGNOSIS — E06.3 HASHIMOTO'S DISEASE: ICD-10-CM

## 2024-12-18 ENCOUNTER — HOSPITAL ENCOUNTER (OUTPATIENT)
Age: 39
Discharge: HOME | End: 2024-12-18
Payer: COMMERCIAL

## 2024-12-18 LAB
CORTIS AM PEAK SERPL-MCNC: 17.9 UG/DL (ref 4.3–22.4)
TSH SERPL DL<=0.005 MIU/L-ACNC: 2.72 UIU/ML (ref 0.27–4.2)

## 2024-12-18 PROCEDURE — 84443 ASSAY THYROID STIM HORMONE: CPT

## 2024-12-18 PROCEDURE — 82533 TOTAL CORTISOL: CPT

## 2025-02-20 ENCOUNTER — TELEPHONE (OUTPATIENT)
Dept: INTERNAL MEDICINE CLINIC | Age: 40
End: 2025-02-20

## 2025-02-20 RX ORDER — LEVOTHYROXINE SODIUM 112 UG/1
112 TABLET ORAL DAILY
Qty: 90 TABLET | Refills: 0 | Status: SHIPPED | OUTPATIENT
Start: 2025-02-20

## 2025-02-20 NOTE — TELEPHONE ENCOUNTER
----- Message from Dr. Ravin Tracy MD sent at 2/19/2025  5:39 PM EST -----  Contact: GARY [phone]  Ok to change  ----- Message -----  From: Shreya Vaughan  Sent: 2/19/2025   3:28 PM EST  To: Ravin Tracy MD    Patient requesting the below script to be transferred to the [facility] pharmacy from historical provider. Pt also requesting new script includes \"compounded\", and pt is aware she will have to pay out of pocket. Please advise      levothyroxine (UNITHROID) 112 MCG tablet     [facility]   [address] [place] [id number] [phone]

## 2025-05-15 RX ORDER — LEVOTHYROXINE SODIUM 112 UG/1
112 TABLET ORAL DAILY
Qty: 90 TABLET | Refills: 1 | Status: SHIPPED | OUTPATIENT
Start: 2025-05-15

## 2025-06-02 ENCOUNTER — HOSPITAL ENCOUNTER (EMERGENCY)
Age: 40
Discharge: HOME | End: 2025-06-02
Payer: COMMERCIAL

## 2025-06-02 VITALS
DIASTOLIC BLOOD PRESSURE: 79 MMHG | OXYGEN SATURATION: 100 % | RESPIRATION RATE: 18 BRPM | SYSTOLIC BLOOD PRESSURE: 109 MMHG | HEART RATE: 60 BPM | TEMPERATURE: 98.6 F

## 2025-06-02 DIAGNOSIS — H81.392 PERIPHERAL VERTIGO INVOLVING LEFT EAR: Primary | ICD-10-CM

## 2025-06-02 DIAGNOSIS — R42 DIZZINESS: ICD-10-CM

## 2025-06-02 DIAGNOSIS — R19.7 DIARRHEA, UNSPECIFIED TYPE: ICD-10-CM

## 2025-06-02 LAB
ALBUMIN SERPL-MCNC: 3.8 G/DL (ref 3.4–5)
ALBUMIN/GLOB SERPL: 1.6 {RATIO} (ref 1.1–2.2)
ALP SERPL-CCNC: 62 U/L (ref 40–129)
ALT SERPL-CCNC: 12 U/L (ref 10–40)
ANION GAP SERPL CALCULATED.3IONS-SCNC: 11 MMOL/L (ref 3–16)
AST SERPL-CCNC: 17 U/L (ref 15–37)
BASOPHILS # BLD: 0 K/UL (ref 0–0.2)
BASOPHILS NFR BLD: 0.2 %
BILIRUB SERPL-MCNC: 0.5 MG/DL (ref 0–1)
BUN SERPL-MCNC: 11 MG/DL (ref 7–20)
CALCIUM SERPL-MCNC: 8.5 MG/DL (ref 8.3–10.6)
CHLORIDE SERPL-SCNC: 102 MMOL/L (ref 99–110)
CO2 SERPL-SCNC: 22 MMOL/L (ref 21–32)
CREAT SERPL-MCNC: 0.7 MG/DL (ref 0.6–1.1)
DEPRECATED RDW RBC AUTO: 12.9 % (ref 12.4–15.4)
EKG ATRIAL RATE: 70 BPM
EKG DIAGNOSIS: NORMAL
EKG P AXIS: 44 DEGREES
EKG P-R INTERVAL: 154 MS
EKG Q-T INTERVAL: 418 MS
EKG QRS DURATION: 78 MS
EKG QTC CALCULATION (BAZETT): 451 MS
EKG R AXIS: 23 DEGREES
EKG T AXIS: 13 DEGREES
EKG VENTRICULAR RATE: 70 BPM
EOSINOPHIL # BLD: 0 K/UL (ref 0–0.6)
EOSINOPHIL NFR BLD: 0.2 %
GFR SERPLBLD CREATININE-BSD FMLA CKD-EPI: >90 ML/MIN/{1.73_M2}
GLUCOSE SERPL-MCNC: 110 MG/DL (ref 70–99)
HCT VFR BLD AUTO: 43.4 % (ref 36–48)
HGB BLD-MCNC: 14.8 G/DL (ref 12–16)
LYMPHOCYTES # BLD: 0.9 K/UL (ref 1–5.1)
LYMPHOCYTES NFR BLD: 9.9 %
MAGNESIUM SERPL-MCNC: 1.8 MG/DL (ref 1.8–2.4)
MCH RBC QN AUTO: 29.9 PG (ref 26–34)
MCHC RBC AUTO-ENTMCNC: 34 G/DL (ref 31–36)
MCV RBC AUTO: 87.9 FL (ref 80–100)
MONOCYTES # BLD: 0.5 K/UL (ref 0–1.3)
MONOCYTES NFR BLD: 5.2 %
NEUTROPHILS # BLD: 7.3 K/UL (ref 1.7–7.7)
NEUTROPHILS NFR BLD: 84.5 %
PLATELET # BLD AUTO: 102 K/UL (ref 135–450)
PMV BLD AUTO: 10.6 FL (ref 5–10.5)
POTASSIUM SERPL-SCNC: 3.9 MMOL/L (ref 3.5–5.1)
PROT SERPL-MCNC: 6.2 G/DL (ref 6.4–8.2)
RBC # BLD AUTO: 4.94 M/UL (ref 4–5.2)
SODIUM SERPL-SCNC: 135 MMOL/L (ref 136–145)
WBC # BLD AUTO: 8.7 K/UL (ref 4–11)

## 2025-06-02 PROCEDURE — 36415 COLL VENOUS BLD VENIPUNCTURE: CPT

## 2025-06-02 PROCEDURE — 83735 ASSAY OF MAGNESIUM: CPT

## 2025-06-02 PROCEDURE — 85025 COMPLETE CBC W/AUTO DIFF WBC: CPT

## 2025-06-02 PROCEDURE — 93010 ELECTROCARDIOGRAM REPORT: CPT | Performed by: INTERNAL MEDICINE

## 2025-06-02 PROCEDURE — 80053 COMPREHEN METABOLIC PANEL: CPT

## 2025-06-02 PROCEDURE — 99284 EMERGENCY DEPT VISIT MOD MDM: CPT

## 2025-06-02 PROCEDURE — 6370000000 HC RX 637 (ALT 250 FOR IP): Performed by: PHYSICIAN ASSISTANT

## 2025-06-02 PROCEDURE — 2580000003 HC RX 258: Performed by: PHYSICIAN ASSISTANT

## 2025-06-02 PROCEDURE — 93005 ELECTROCARDIOGRAM TRACING: CPT | Performed by: PHYSICIAN ASSISTANT

## 2025-06-02 RX ORDER — MECLIZINE HCL 25MG 25 MG/1
25 TABLET, CHEWABLE ORAL ONCE
Status: COMPLETED | OUTPATIENT
Start: 2025-06-02 | End: 2025-06-02

## 2025-06-02 RX ORDER — PROCHLORPERAZINE EDISYLATE 5 MG/ML
10 INJECTION INTRAMUSCULAR; INTRAVENOUS ONCE
Status: DISCONTINUED | OUTPATIENT
Start: 2025-06-02 | End: 2025-06-02 | Stop reason: HOSPADM

## 2025-06-02 RX ORDER — DIPHENHYDRAMINE HYDROCHLORIDE 50 MG/ML
25 INJECTION, SOLUTION INTRAMUSCULAR; INTRAVENOUS ONCE
Status: DISCONTINUED | OUTPATIENT
Start: 2025-06-02 | End: 2025-06-02 | Stop reason: HOSPADM

## 2025-06-02 RX ORDER — DIPHENHYDRAMINE HCL 25 MG
25 CAPSULE ORAL EVERY 4 HOURS PRN
Qty: 25 CAPSULE | Refills: 0 | Status: SHIPPED | OUTPATIENT
Start: 2025-06-02 | End: 2025-06-12

## 2025-06-02 RX ORDER — MECLIZINE HYDROCHLORIDE 25 MG/1
25 TABLET ORAL 3 TIMES DAILY PRN
Qty: 20 TABLET | Refills: 0 | Status: SHIPPED | OUTPATIENT
Start: 2025-06-02 | End: 2025-06-12

## 2025-06-02 RX ORDER — 0.9 % SODIUM CHLORIDE 0.9 %
1000 INTRAVENOUS SOLUTION INTRAVENOUS ONCE
Status: COMPLETED | OUTPATIENT
Start: 2025-06-02 | End: 2025-06-02

## 2025-06-02 RX ADMIN — SODIUM CHLORIDE 1000 ML: 0.9 INJECTION, SOLUTION INTRAVENOUS at 13:02

## 2025-06-02 RX ADMIN — MECLIZINE HYDROCHLORIDE 25 MG: 25 TABLET, CHEWABLE ORAL at 13:01

## 2025-06-02 NOTE — DISCHARGE INSTRUCTIONS
Continue the meclizine and take your Reglan with Benadryl.  Please call your ENT office and see if you can get established with vestibular rehab.  Return here if you develop any worsening dizziness vomiting numbness in your extremities weakness in your extremities or passing out

## 2025-06-02 NOTE — ED PROVIDER NOTES
[facility] EMERGENCY DEPARTMENT  EMERGENCY DEPARTMENT ENCOUNTER        Pt Name: Keisha Miranda  MRN: [id number]  Birthdate 1985  Date of evaluation: 6/2/2025  Provider: Ginger De Guzman PA-C  PCP: Ravin Tracy MD  Note Started: 12:38 PM EDT 6/2/25      STEF. I have evaluated this patient.        CHIEF COMPLAINT       Chief Complaint   Patient presents with    Dehydration     Patient states she gets rapid onset dehydration. The patient feels nauseas, diarrhea, vomiting, headache.        HISTORY OF PRESENT ILLNESS: 1 or more Elements     History From: patient and friend at bedside            Chief Complaint: dizziness    Keisha Miranda is a 39 y.o. female who presents with complaints of acute nausea diarrhea headache left ear pain and dizziness upon waking up this morning.  She states she has had the symptoms in the past and had to come to the ER for fluids.  She was told by her regular doctor that steroids might help.  She has a history of Hashimoto's.  She denies any vomiting chest pain abdominal pain shortness of breath fever.  She has tinnitus at baseline and has been dealing with a eustachian tube issue with her left ear.  Otherwise no other medical problems.  She took her Reglan prior to coming here to the ER    Nursing Notes were all reviewed and agreed with or any disagreements were addressed in the HPI.    REVIEW OF SYSTEMS :      Review of Systems    Positives and Pertinent negatives as per HPI.     SURGICAL HISTORY     Past Surgical History:   Procedure Laterality Date    SHOULDER SURGERY      left shoulder    WISDOM TOOTH EXTRACTION         CURRENTMEDICATIONS       Previous Medications    LEVOTHYROXINE (UNITHROID) 112 MCG TABLET    Take 1 tablet by mouth Daily    METOCLOPRAMIDE (REGLAN) 10 MG TABLET    Take 1 tablet by mouth 3 times daily (with meals) for 5 days    MULTIPLE VITAMINS-MINERALS (MULTIVITAL PO)    Take by mouth       ALLERGIES     Sulfa antibiotics and Zofran

## 2025-06-02 NOTE — ED PROVIDER NOTES
The Ekg interpreted by me shows  normal sinus rhythm with a rate of 70  Axis is   Normal  QTc is  normal  Intervals and Durations are unremarkable.      ST Segments: normal  No significant change from prior EKG dated 9/7/2022         Adilene Ambrocio MD  06/02/25 2625

## 2025-07-15 ENCOUNTER — NURSE TRIAGE (OUTPATIENT)
Dept: OTHER | Facility: CLINIC | Age: 40
End: 2025-07-15

## 2025-07-15 NOTE — TELEPHONE ENCOUNTER
Location of patient: [place]    Subjective: Caller states she had ED visit on 6/2/25 of vomiting, diarrhea, dehydration of sudden onset. States she was told by PCP to go to ED. Calling to dispute higher deductible due to not calling nurse line prior to ED visit. States she was physically unable to call due to condition. Advised that deductible is determined by insurance company and whether ED visit is determined as emergency and nurse line is to direct associate to appropriate care. Informed that in future, she can call nurse line for duplicate triage if advised by another medical provider that she should go to ED to have on chart. Also advised associate to call numbers on back of insurance card to obtain an Explanation of Benefits statement and for information regarding the appeal process of charges including deductible.         Recommended disposition: information only    Care advice provided, patient verbalizes understanding; denies any other questions or concerns; instructed to call back for any new or worsening symptoms.    Information Only    Attention Provider:  Thank you for allowing me to participate in the care of your patient.  The patient was connected to triage in response to symptoms provided.   Please do not respond through this encounter as the response is not directed to a shared pool.      Reason for Disposition   General information question, no triage required and triager able to answer question    Answer Assessment - Initial Assessment Questions  1. REASON FOR CALL: \"What is the main reason for your call?\" or \"How can I best help you?\"      Dispute of deductible for ED visit for 6/2/2025  2. SYMPTOMS : \"Do you have any symptoms?\"       N/a  3. OTHER QUESTIONS: \"Do you have any other questions?\"      no    Protocols used: Information Only Call - [facility]

## 2025-08-14 ENCOUNTER — TELEPHONE (OUTPATIENT)
Dept: INTERNAL MEDICINE CLINIC | Age: 40
End: 2025-08-14

## 2025-08-14 RX ORDER — LEVOTHYROXINE SODIUM 125 UG/1
125 TABLET ORAL DAILY
Qty: 90 TABLET | Refills: 0 | Status: SHIPPED | OUTPATIENT
Start: 2025-08-14